# Patient Record
Sex: FEMALE | Race: WHITE | NOT HISPANIC OR LATINO | URBAN - METROPOLITAN AREA
[De-identification: names, ages, dates, MRNs, and addresses within clinical notes are randomized per-mention and may not be internally consistent; named-entity substitution may affect disease eponyms.]

---

## 2017-07-13 ENCOUNTER — OUTPATIENT (OUTPATIENT)
Dept: OUTPATIENT SERVICES | Facility: HOSPITAL | Age: 82
LOS: 1 days | End: 2017-07-13
Payer: MEDICARE

## 2017-07-13 ENCOUNTER — APPOINTMENT (OUTPATIENT)
Dept: CT IMAGING | Facility: IMAGING CENTER | Age: 82
End: 2017-07-13

## 2017-07-13 DIAGNOSIS — Z98.89 OTHER SPECIFIED POSTPROCEDURAL STATES: Chronic | ICD-10-CM

## 2017-07-13 DIAGNOSIS — Z00.8 ENCOUNTER FOR OTHER GENERAL EXAMINATION: ICD-10-CM

## 2017-07-13 PROCEDURE — 71250 CT THORAX DX C-: CPT

## 2017-10-09 ENCOUNTER — INPATIENT (INPATIENT)
Facility: HOSPITAL | Age: 82
LOS: 2 days | Discharge: HOME | End: 2017-10-12
Attending: INTERNAL MEDICINE | Admitting: INTERNAL MEDICINE
Payer: MEDICARE

## 2017-10-09 VITALS
TEMPERATURE: 99 F | RESPIRATION RATE: 20 BRPM | SYSTOLIC BLOOD PRESSURE: 157 MMHG | HEART RATE: 92 BPM | OXYGEN SATURATION: 96 % | DIASTOLIC BLOOD PRESSURE: 88 MMHG

## 2017-10-09 DIAGNOSIS — R04.2 HEMOPTYSIS: ICD-10-CM

## 2017-10-09 DIAGNOSIS — Z98.89 OTHER SPECIFIED POSTPROCEDURAL STATES: Chronic | ICD-10-CM

## 2017-10-09 DIAGNOSIS — J43.2 CENTRILOBULAR EMPHYSEMA: ICD-10-CM

## 2017-10-09 DIAGNOSIS — I10 ESSENTIAL (PRIMARY) HYPERTENSION: ICD-10-CM

## 2017-10-09 DIAGNOSIS — E78.5 HYPERLIPIDEMIA, UNSPECIFIED: ICD-10-CM

## 2017-10-09 LAB
AGGLUTINATION: PRESENT — SIGNIFICANT CHANGE UP
ALBUMIN SERPL ELPH-MCNC: 4.5 G/DL — SIGNIFICANT CHANGE UP (ref 3.3–5)
ALP SERPL-CCNC: 84 U/L — SIGNIFICANT CHANGE UP (ref 40–120)
ALT FLD-CCNC: 18 U/L — SIGNIFICANT CHANGE UP (ref 4–33)
APTT BLD: 26.9 SEC — LOW (ref 27.5–37.4)
AST SERPL-CCNC: 20 U/L — SIGNIFICANT CHANGE UP (ref 4–32)
BASE EXCESS BLDV CALC-SCNC: 2.8 MMOL/L — SIGNIFICANT CHANGE UP
BASOPHILS # BLD AUTO: 0.02 K/UL — SIGNIFICANT CHANGE UP (ref 0–0.2)
BASOPHILS NFR BLD AUTO: 0.2 % — SIGNIFICANT CHANGE UP (ref 0–2)
BASOPHILS NFR SPEC: 0 % — SIGNIFICANT CHANGE UP (ref 0–2)
BILIRUB SERPL-MCNC: 0.4 MG/DL — SIGNIFICANT CHANGE UP (ref 0.2–1.2)
BLD GP AB SCN SERPL QL: NEGATIVE — SIGNIFICANT CHANGE UP
BLOOD GAS VENOUS - CREATININE: 0.8 MG/DL — SIGNIFICANT CHANGE UP (ref 0.5–1.3)
BUN SERPL-MCNC: 14 MG/DL — SIGNIFICANT CHANGE UP (ref 7–23)
CALCIUM SERPL-MCNC: 10 MG/DL — SIGNIFICANT CHANGE UP (ref 8.4–10.5)
CHLORIDE BLDV-SCNC: 104 MMOL/L — SIGNIFICANT CHANGE UP (ref 96–108)
CHLORIDE SERPL-SCNC: 100 MMOL/L — SIGNIFICANT CHANGE UP (ref 98–107)
CK SERPL-CCNC: 116 U/L — SIGNIFICANT CHANGE UP (ref 25–170)
CO2 SERPL-SCNC: 24 MMOL/L — SIGNIFICANT CHANGE UP (ref 22–31)
CREAT SERPL-MCNC: 0.96 MG/DL — SIGNIFICANT CHANGE UP (ref 0.5–1.3)
EOSINOPHIL # BLD AUTO: 0 K/UL — SIGNIFICANT CHANGE UP (ref 0–0.5)
EOSINOPHIL NFR BLD AUTO: 0 % — SIGNIFICANT CHANGE UP (ref 0–6)
EOSINOPHIL NFR FLD: 0 % — SIGNIFICANT CHANGE UP (ref 0–6)
GAS PNL BLDV: 137 MMOL/L — SIGNIFICANT CHANGE UP (ref 136–146)
GLUCOSE BLDV-MCNC: 122 — HIGH (ref 70–99)
GLUCOSE SERPL-MCNC: 116 MG/DL — HIGH (ref 70–99)
HCO3 BLDV-SCNC: 26 MMOL/L — SIGNIFICANT CHANGE UP (ref 20–27)
HCT VFR BLD CALC: 39.1 % — SIGNIFICANT CHANGE UP (ref 34.5–45)
HCT VFR BLDV CALC: 47.4 % — HIGH (ref 34.5–45)
HGB BLD-MCNC: 14.9 G/DL — SIGNIFICANT CHANGE UP (ref 11.5–15.5)
HGB BLDV-MCNC: 15.5 G/DL — SIGNIFICANT CHANGE UP (ref 11.5–15.5)
IMM GRANULOCYTES # BLD AUTO: 0.05 # — SIGNIFICANT CHANGE UP
IMM GRANULOCYTES NFR BLD AUTO: 0.5 % — SIGNIFICANT CHANGE UP (ref 0–1.5)
INR BLD: 0.95 — SIGNIFICANT CHANGE UP (ref 0.88–1.17)
LACTATE BLDV-MCNC: 1.3 MMOL/L — SIGNIFICANT CHANGE UP (ref 0.5–2)
LYMPHOCYTES # BLD AUTO: 1.16 K/UL — SIGNIFICANT CHANGE UP (ref 1–3.3)
LYMPHOCYTES # BLD AUTO: 11 % — LOW (ref 13–44)
LYMPHOCYTES NFR SPEC AUTO: 6.1 % — LOW (ref 13–44)
MCHC RBC-ENTMCNC: 35.5 PG — HIGH (ref 27–34)
MCHC RBC-ENTMCNC: 38.1 % — HIGH (ref 32–36)
MCV RBC AUTO: 93.1 FL — SIGNIFICANT CHANGE UP (ref 80–100)
MONOCYTES # BLD AUTO: 0.64 K/UL — SIGNIFICANT CHANGE UP (ref 0–0.9)
MONOCYTES NFR BLD AUTO: 6.1 % — SIGNIFICANT CHANGE UP (ref 2–14)
MONOCYTES NFR BLD: 6.9 % — SIGNIFICANT CHANGE UP (ref 2–9)
NEUTROPHIL AB SER-ACNC: 80.9 % — HIGH (ref 43–77)
NEUTROPHILS # BLD AUTO: 8.65 K/UL — HIGH (ref 1.8–7.4)
NEUTROPHILS NFR BLD AUTO: 82.2 % — HIGH (ref 43–77)
NEUTS BAND # BLD: 0.9 % — SIGNIFICANT CHANGE UP (ref 0–6)
NRBC # FLD: 0 — SIGNIFICANT CHANGE UP
PCO2 BLDV: 45 MMHG — SIGNIFICANT CHANGE UP (ref 41–51)
PH BLDV: 7.4 PH — SIGNIFICANT CHANGE UP (ref 7.32–7.43)
PLATELET # BLD AUTO: 252 K/UL — SIGNIFICANT CHANGE UP (ref 150–400)
PLATELET COUNT - ESTIMATE: NORMAL — SIGNIFICANT CHANGE UP
PMV BLD: 9.9 FL — SIGNIFICANT CHANGE UP (ref 7–13)
PO2 BLDV: 31 MMHG — LOW (ref 35–40)
POTASSIUM BLDV-SCNC: 4.2 MMOL/L — SIGNIFICANT CHANGE UP (ref 3.4–4.5)
POTASSIUM SERPL-MCNC: 4.6 MMOL/L — SIGNIFICANT CHANGE UP (ref 3.5–5.3)
POTASSIUM SERPL-SCNC: 4.6 MMOL/L — SIGNIFICANT CHANGE UP (ref 3.5–5.3)
PROT SERPL-MCNC: 7.5 G/DL — SIGNIFICANT CHANGE UP (ref 6–8.3)
PROTHROM AB SERPL-ACNC: 10.6 SEC — SIGNIFICANT CHANGE UP (ref 9.8–13.1)
RBC # BLD: 4.2 M/UL — SIGNIFICANT CHANGE UP (ref 3.8–5.2)
RBC # FLD: 14.1 % — SIGNIFICANT CHANGE UP (ref 10.3–14.5)
RH IG SCN BLD-IMP: POSITIVE — SIGNIFICANT CHANGE UP
SAO2 % BLDV: 56.8 % — LOW (ref 60–85)
SODIUM SERPL-SCNC: 141 MMOL/L — SIGNIFICANT CHANGE UP (ref 135–145)
TROPONIN T SERPL-MCNC: < 0.06 NG/ML — SIGNIFICANT CHANGE UP (ref 0–0.06)
VARIANT LYMPHS # BLD: 5.2 % — SIGNIFICANT CHANGE UP
WBC # BLD: 10.52 K/UL — HIGH (ref 3.8–10.5)
WBC # FLD AUTO: 10.52 K/UL — HIGH (ref 3.8–10.5)

## 2017-10-09 PROCEDURE — 99223 1ST HOSP IP/OBS HIGH 75: CPT

## 2017-10-09 PROCEDURE — 71010: CPT | Mod: 26

## 2017-10-09 PROCEDURE — 71275 CT ANGIOGRAPHY CHEST: CPT | Mod: 26

## 2017-10-09 RX ORDER — AZITHROMYCIN 500 MG/1
1000 TABLET, FILM COATED ORAL ONCE
Qty: 0 | Refills: 0 | Status: DISCONTINUED | OUTPATIENT
Start: 2017-10-09 | End: 2017-10-09

## 2017-10-09 RX ORDER — NICOTINE POLACRILEX 2 MG
1 GUM BUCCAL DAILY
Qty: 0 | Refills: 0 | Status: DISCONTINUED | OUTPATIENT
Start: 2017-10-09 | End: 2017-10-12

## 2017-10-09 RX ORDER — CEFTRIAXONE 500 MG/1
1 INJECTION, POWDER, FOR SOLUTION INTRAMUSCULAR; INTRAVENOUS ONCE
Qty: 0 | Refills: 0 | Status: COMPLETED | OUTPATIENT
Start: 2017-10-09 | End: 2017-10-09

## 2017-10-09 RX ORDER — AZITHROMYCIN 500 MG/1
500 TABLET, FILM COATED ORAL EVERY 24 HOURS
Qty: 0 | Refills: 0 | Status: DISCONTINUED | OUTPATIENT
Start: 2017-10-10 | End: 2017-10-12

## 2017-10-09 RX ORDER — ENOXAPARIN SODIUM 100 MG/ML
40 INJECTION SUBCUTANEOUS EVERY 24 HOURS
Qty: 0 | Refills: 0 | Status: DISCONTINUED | OUTPATIENT
Start: 2017-10-09 | End: 2017-10-12

## 2017-10-09 RX ORDER — IPRATROPIUM/ALBUTEROL SULFATE 18-103MCG
3 AEROSOL WITH ADAPTER (GRAM) INHALATION EVERY 6 HOURS
Qty: 0 | Refills: 0 | Status: DISCONTINUED | OUTPATIENT
Start: 2017-10-09 | End: 2017-10-12

## 2017-10-09 RX ORDER — CEFTRIAXONE 500 MG/1
1 INJECTION, POWDER, FOR SOLUTION INTRAMUSCULAR; INTRAVENOUS EVERY 24 HOURS
Qty: 0 | Refills: 0 | Status: DISCONTINUED | OUTPATIENT
Start: 2017-10-10 | End: 2017-10-12

## 2017-10-09 RX ORDER — AZITHROMYCIN 500 MG/1
500 TABLET, FILM COATED ORAL ONCE
Qty: 0 | Refills: 0 | Status: COMPLETED | OUTPATIENT
Start: 2017-10-09 | End: 2017-10-09

## 2017-10-09 RX ORDER — BUDESONIDE AND FORMOTEROL FUMARATE DIHYDRATE 160; 4.5 UG/1; UG/1
2 AEROSOL RESPIRATORY (INHALATION)
Qty: 0 | Refills: 0 | Status: DISCONTINUED | OUTPATIENT
Start: 2017-10-09 | End: 2017-10-12

## 2017-10-09 RX ORDER — ACETAMINOPHEN 500 MG
650 TABLET ORAL EVERY 6 HOURS
Qty: 0 | Refills: 0 | Status: DISCONTINUED | OUTPATIENT
Start: 2017-10-09 | End: 2017-10-12

## 2017-10-09 RX ADMIN — AZITHROMYCIN 250 MILLIGRAM(S): 500 TABLET, FILM COATED ORAL at 20:09

## 2017-10-09 RX ADMIN — CEFTRIAXONE 100 GRAM(S): 500 INJECTION, POWDER, FOR SOLUTION INTRAMUSCULAR; INTRAVENOUS at 19:05

## 2017-10-09 NOTE — ED PROVIDER NOTE - PMH
COPD (chronic obstructive pulmonary disease)    COPD (chronic obstructive pulmonary disease)    Diverticulitis    Hyperlipidemia (ICD9 272.4)    Meniscus, Medial, tear  Left  Osteopenia    Smoker    Thyroid Nodule  @ 1974

## 2017-10-09 NOTE — ED ADULT TRIAGE NOTE - CHIEF COMPLAINT QUOTE
Pt states she has a productive cough and not feeling well states she is coughing up blood. Pt just placed on Zithromax and prednisone. PMH COPD. Pt states she has a productive cough and not feeling well states she is coughing up blood and has heaviness in her chest.  Pt just placed on Zithromax and prednisone. PMH COPD.

## 2017-10-09 NOTE — ED ADULT NURSE NOTE - OBJECTIVE STATEMENT
pt to spot # 7 alert and oriented X 3 with family at bedside, pt here for eval of hemoptysis x 2 days. As per family pt has been coughing at baseline due to COPD, for the past 2 days initially blood tinged sputum, today more blood (about 1 teaspoon), no clots. Pt denies any fever, chills, denies night sweats, weight loss, denies sick contacts, hx of incarceration. Denies cp, sob, abd pain, n/v/d, denies use of blood thinners, epistaxis, denies melena. Pt had similar episodes in the past, had negative bronchoscopy. pulm - DR Avelar, pcp - DR Hoffman. As per family pt is more sob than baseline. labs drawn and sent, vitals as noted, will monitor and follow up

## 2017-10-09 NOTE — H&P ADULT - NSHPSOCIALHISTORY_GEN_ALL_CORE
Social History:    Marital Status:    Occupation:   Lives with:     Substance Use (street drugs): ( x ) never used  (  ) other:  Tobacco Usage:  (   ) never smoked   (   ) former smoker   ( x  ) current smoker  (     ) pack year  (        ) last cigarette date  Alcohol Usage: none       (     ) Advanced Directives: (  x   ) None    (      ) DNR    (     ) DNI    (     ) Health Care Proxy:

## 2017-10-09 NOTE — H&P ADULT - NSHPREVIEWOFSYSTEMS_GEN_ALL_CORE
REVIEW OF SYSTEMS:    CONSTITUTIONAL: No weakness, fevers or chills, no weight loss  EYES/ENT: No visual changes;  No dysphagia or odynophagia, no tinnitus  NECK: No pain or stiffness  RESPIRATORY: + cough and SOB, + hemoptysis, no wheezing  CARDIOVASCULAR: No chest pain or palpitations; No lower extremity edema  GASTROINTESTINAL: No abdominal or epigastric pain. No nausea, vomiting, or hematemesis; No diarrhea or constipation. No melena or hematochezia.  MUSCULOSKELETAL: No joint pain, swelling, erythema or warmth, no back pain  GENITOURINARY: No dysuria, frequency or hematuria, no suprapubic pain  NEUROLOGICAL: No numbness or weakness, no headache, no syncope, no gait abnormalities   SKIN: No itching, burning, rashes, or lesions   All other review of systems is negative unless indicated above.

## 2017-10-09 NOTE — CHART NOTE - NSCHARTNOTEFT_GEN_A_CORE
RRT called for b/l UE shaking for unknown period of time. On arrival patient was noted to be aphasic with word finding difficulty. Patient was noted to be awake, alert and moving all extremities spontaneously but not to command. PAtient was HD stable with normal glucose. Code stroke was initially called for concern for CVA. But as per Day MAR and neuro, patient was known to both of them and this is her post ictal state. Patient was given an additional 1000mg Keppra. CTH deferred given CTH on admission and likely seizure. Communicated with primary team and RN to administer Ativan PRN for additional seizure like activity. WIll likely need 2nd agent if breakthrough seizure    Brian Eller PGY3  [id number]

## 2017-10-09 NOTE — ED ADULT NURSE NOTE - CHIEF COMPLAINT QUOTE
Pt states she has a productive cough and not feeling well states she is coughing up blood and has heaviness in her chest.  Pt just placed on Zithromax and prednisone. PMH COPD.

## 2017-10-09 NOTE — ED PROVIDER NOTE - ATTENDING CONTRIBUTION TO CARE
87 y/o female COPD, HTN hemoptysis x 2 days.  No fevers or chills.  Pt appears well.  PE lungs cta b/l abd nt/nd neuro nonfocal exam  CXR clear.   Will admit for work up and do ct angio.

## 2017-10-09 NOTE — H&P ADULT - PROBLEM SELECTOR PLAN 1
- Likely 2/2 PNA. No PE on CT angio. lung nodule unchanged  - Consult pulm in am- Dr Awad or Dr Mills  - Ceftriaxone + azithromycin for CAP   - cough suppressant - Likely 2/2 PNA. No PE on CT angio. lung nodule unchanged  - Consult pulm in am- Dr Avila [facility] [phone] [facility]  (Dr Mills may be covering). Please also call her PCP Dr Paris Hamm per pt request [phone]  - Ceftriaxone + azithromycin for CAP   - cough suppressant - Likely 2/2 left lower lobe PNA. No PE on CT angio. lung nodule noted and unchanged  - Consult pulm in am- Dr Avila [facility] [phone] [facility]  (Dr Mills may be covering). Please also call her PCP Dr Paris Hamm per pt request [phone]  - Ceftriaxone + azithromycin for CAP   - cough suppressant

## 2017-10-09 NOTE — ED PROVIDER NOTE - PROGRESS NOTE DETAILS
MARYSOL Medel - spoke with MD covering for Dr belcher/diane; will admit to medicine with pulm f/u. will give prednisone and start nebs.

## 2017-10-09 NOTE — ED ADULT NURSE NOTE - PSH
Breast Nodule (ICD9 793.89)  Removal of left  breast nodule 2005  Hammertoe (ICD9 735.3)  Repair of hammertoe Rt foot  @ 2004  History of appendectomy    History of bronchoscopy    Lipoma (ICD9 214.9)  Removal of Rt shoulder lipoma 2002  Meniscus tear  Left knee 10/09  Obesity (ICD9 278.00)    S/P cataract surgery  right eye

## 2017-10-09 NOTE — H&P ADULT - HISTORY OF PRESENT ILLNESS
87 yo F with h/o COPD, current smoker, HTN, HLD p/w hemoptysis. Pt states that for the past day she has been coughing up blood with clots. Also feels SOB but no wheezing. She has no CP, no palpitations. She has no fevers or chills. She denies any leg pain or swelling. No orthopnea or PND. No sick contacts or recent travel .    ED VS:  157/88  92  99.1  20  96% on RA  Pt given ceftriaxone and azithromycin

## 2017-10-09 NOTE — ED PROVIDER NOTE - OBJECTIVE STATEMENT
Pt is 87 y/o female Pt is 87 y/o female female with PMhx of COPD (still smokes about 6 cigarettes a day - " I quit yesterday"), HTN here for eval of hemoptysis x 2 days. As per family pt has been coughing at baseline due to COPD, for the past 2 days initially blood tinged sputum, today more blood (about 1 teaspoon), no clots. Pt denies any fever, chills, denies night sweats, weight loss, denies sick contacts, hx of incarceration. Denies cp, sob, abd pain, n/v/d, denies use of blood thinners, epistaxis, denies melena. Pt had similar episodes in the past, had negative bronchoscopy. pulm - DR Avelar, pcp - DR Hoffman. As per family pt is more sob than baseline.

## 2017-10-09 NOTE — PATIENT PROFILE ADULT. - TEACHING/LEARNING LEARNING PREFERENCES
skill demonstration/written material/verbal instruction/individual instruction individual instruction/written material/verbal instruction

## 2017-10-09 NOTE — H&P ADULT - NSHPLABSRESULTS_GEN_ALL_CORE
.  LABS:                         14.9   10.52 )-----------( 252      ( 09 Oct 2017 17:20 )             39.1     10-09    141  |  100  |  14  ----------------------------<  116<H>  4.6   |  24  |  0.96    Ca    10.0      09 Oct 2017 17:20    TPro  7.5  /  Alb  4.5  /  TBili  0.4  /  DBili  x   /  AST  20  /  ALT  18  /  AlkPhos  84  10-09    PT/INR - ( 09 Oct 2017 17:20 )   PT: 10.6 SEC;   INR: 0.95          PTT - ( 09 Oct 2017 17:20 )  PTT:26.9 SEC    CARDIAC MARKERS ( 09 Oct 2017 17:20 )  x     / < 0.06 ng/mL / 116 u/L / x     / x                RADIOLOGY, EKG & ADDITIONAL TESTS: Reviewed.

## 2017-10-10 LAB
HCT VFR BLD CALC: 40.5 % — SIGNIFICANT CHANGE UP (ref 34.5–45)
HGB BLD-MCNC: 13.6 G/DL — SIGNIFICANT CHANGE UP (ref 11.5–15.5)
MCHC RBC-ENTMCNC: 30.7 PG — SIGNIFICANT CHANGE UP (ref 27–34)
MCHC RBC-ENTMCNC: 33.6 % — SIGNIFICANT CHANGE UP (ref 32–36)
MCV RBC AUTO: 91.4 FL — SIGNIFICANT CHANGE UP (ref 80–100)
NRBC # FLD: 0 — SIGNIFICANT CHANGE UP
PLATELET # BLD AUTO: 205 K/UL — SIGNIFICANT CHANGE UP (ref 150–400)
PMV BLD: 9.4 FL — SIGNIFICANT CHANGE UP (ref 7–13)
RBC # BLD: 4.43 M/UL — SIGNIFICANT CHANGE UP (ref 3.8–5.2)
RBC # FLD: 14 % — SIGNIFICANT CHANGE UP (ref 10.3–14.5)
SPECIMEN SOURCE: SIGNIFICANT CHANGE UP
SPECIMEN SOURCE: SIGNIFICANT CHANGE UP
WBC # BLD: 9.67 K/UL — SIGNIFICANT CHANGE UP (ref 3.8–10.5)
WBC # FLD AUTO: 9.67 K/UL — SIGNIFICANT CHANGE UP (ref 3.8–10.5)

## 2017-10-10 RX ADMIN — Medication 40 MILLIGRAM(S): at 12:38

## 2017-10-10 RX ADMIN — BUDESONIDE AND FORMOTEROL FUMARATE DIHYDRATE 2 PUFF(S): 160; 4.5 AEROSOL RESPIRATORY (INHALATION) at 22:03

## 2017-10-10 RX ADMIN — CEFTRIAXONE 100 GRAM(S): 500 INJECTION, POWDER, FOR SOLUTION INTRAMUSCULAR; INTRAVENOUS at 19:05

## 2017-10-10 RX ADMIN — BUDESONIDE AND FORMOTEROL FUMARATE DIHYDRATE 2 PUFF(S): 160; 4.5 AEROSOL RESPIRATORY (INHALATION) at 10:28

## 2017-10-10 RX ADMIN — Medication 3 MILLILITER(S): at 17:57

## 2017-10-10 RX ADMIN — AZITHROMYCIN 250 MILLIGRAM(S): 500 TABLET, FILM COATED ORAL at 19:39

## 2017-10-10 RX ADMIN — Medication 1 PATCH: at 16:49

## 2017-10-10 RX ADMIN — ENOXAPARIN SODIUM 40 MILLIGRAM(S): 100 INJECTION SUBCUTANEOUS at 10:28

## 2017-10-10 NOTE — PROGRESS NOTE ADULT - PROBLEM SELECTOR PLAN 1
- Likely 2/2 left lower lobe PNA. No PE on CT angio. lung nodule noted and unchanged  - Ceftriaxone + azithromycin for CAP   - cough suppressants  pulm c/s appreciated  cont steroids

## 2017-10-10 NOTE — PROGRESS NOTE ADULT - SUBJECTIVE AND OBJECTIVE BOX
Patient is a 86y old  Female who presents with a chief complaint of hemoptysis (09 Oct 2017 23:40)      SUBJECTIVE / OVERNIGHT EVENTS: feels well, cough and hemoptysis persists, denies sob at rest    MEDICATIONS  (STANDING):  azithromycin  IVPB 500 milliGRAM(s) IV Intermittent every 24 hours  buDESOnide 160 MICROgram(s)/formoterol 4.5 MICROgram(s) Inhaler 2 Puff(s) Inhalation two times a day  cefTRIAXone   IVPB 1 Gram(s) IV Intermittent every 24 hours  enoxaparin Injectable 40 milliGRAM(s) SubCutaneous every 24 hours  nicotine -  14 mG/24Hr(s) Patch 1 patch Transdermal daily  predniSONE   Tablet 40 milliGRAM(s) Oral daily    MEDICATIONS  (PRN):  acetaminophen   Tablet 650 milliGRAM(s) Oral every 6 hours PRN For Temp greater than 38 C (100.4 F)  ALBUTerol/ipratropium for Nebulization 3 milliLiter(s) Nebulizer every 6 hours PRN Shortness of Breath and/or Wheezing  benzonatate 100 milliGRAM(s) Oral three times a day PRN Cough      Vital Signs Last 24 Hrs  T(C): 37 (10 Oct 2017 15:30), Max: 37.1 (09 Oct 2017 22:27)  T(F): 98.6 (10 Oct 2017 15:30), Max: 98.7 (09 Oct 2017 22:27)  HR: 81 (10 Oct 2017 15:30) (81 - 90)  BP: 131/76 (10 Oct 2017 15:30) (119/60 - 155/95)  BP(mean): --  RR: 18 (10 Oct 2017 15:30) (16 - 18)  SpO2: 96% (10 Oct 2017 15:30) (95% - 97%)  CAPILLARY BLOOD GLUCOSE        I&O's Summary      PHYSICAL EXAM:  GENERAL: NAD, well-developed  HEAD:  Atraumatic, Normocephalic  EYES: EOMI, PERRLA, conjunctiva and sclera clear  NECK: Supple, No JVD  CHEST/LUNG: Clear to auscultation bilaterally; No wheeze  HEART: Regular rate and rhythm; No murmurs, rubs, or gallops  ABDOMEN: Soft, Nontender, Nondistended; Bowel sounds present  EXTREMITIES:  2+ Peripheral Pulses, No clubbing, cyanosis, or edema  PSYCH: AAOx3  NEUROLOGY: non-focal  SKIN: No rashes or lesions    LABS:                        13.6   9.67  )-----------( 205      ( 10 Oct 2017 10:25 )             40.5     10-09    141  |  100  |  14  ----------------------------<  116<H>  4.6   |  24  |  0.96    Ca    10.0      09 Oct 2017 17:20    TPro  7.5  /  Alb  4.5  /  TBili  0.4  /  DBili  x   /  AST  20  /  ALT  18  /  AlkPhos  84  10-09    PT/INR - ( 09 Oct 2017 17:20 )   PT: 10.6 SEC;   INR: 0.95          PTT - ( 09 Oct 2017 17:20 )  PTT:26.9 SEC  CARDIAC MARKERS ( 09 Oct 2017 17:20 )  x     / < 0.06 ng/mL / 116 u/L / x     / x              RADIOLOGY & ADDITIONAL TESTS:yes    Imaging Personally Reviewed:    Consultant(s) Notes Reviewed:  yes    Care Discussed with Consultants/Other Providers:

## 2017-10-10 NOTE — PROGRESS NOTE ADULT - SUBJECTIVE AND OBJECTIVE BOX
PULMONARY PROGRESS NOTE    MARIVEL LONGORIA  MRN-[id number]    Patient is a 86y old  Female who presents with a chief complaint of hemoptysis (09 Oct 2017 23:40)      HPI:  -hemoptysis on and off since weekend, about teaspoon blood clots.  breathing comfortably, +cough, no fever/chills/sick contacts.    ROS:   -denies N/V/D    ACTIVE MEDICATION LIST:  MEDICATIONS  (STANDING):  azithromycin  IVPB 500 milliGRAM(s) IV Intermittent every 24 hours  buDESOnide 160 MICROgram(s)/formoterol 4.5 MICROgram(s) Inhaler 2 Puff(s) Inhalation two times a day  cefTRIAXone   IVPB 1 Gram(s) IV Intermittent every 24 hours  enoxaparin Injectable 40 milliGRAM(s) SubCutaneous every 24 hours  nicotine -  14 mG/24Hr(s) Patch 1 patch Transdermal daily  predniSONE   Tablet 40 milliGRAM(s) Oral daily    MEDICATIONS  (PRN):  acetaminophen   Tablet 650 milliGRAM(s) Oral every 6 hours PRN For Temp greater than 38 C (100.4 F)  ALBUTerol/ipratropium for Nebulization 3 milliLiter(s) Nebulizer every 6 hours PRN Shortness of Breath and/or Wheezing  benzonatate 100 milliGRAM(s) Oral three times a day PRN Cough      EXAM:  Vital Signs Last 24 Hrs  T(C): 36.6 (10 Oct 2017 05:27), Max: 37.3 (09 Oct 2017 15:22)  T(F): 97.8 (10 Oct 2017 05:27), Max: 99.2 (09 Oct 2017 17:33)  HR: 81 (10 Oct 2017 05:27) (81 - 98)  BP: 119/60 (10 Oct 2017 05:27) (119/60 - 169/74)  BP(mean): --  RR: 18 (10 Oct 2017 05:27) (16 - 20)  SpO2: 96% (10 Oct 2017 05:27) (95% - 97%)    GENERAL: The patient is awake and alert in no apparent distress.     SKIN: Warm, dry, no rashes    LUNGS: decreased bs bilat    HEART: S1/S2    ABDOMEN: +BS, Soft, Nontender    EXTREMITIES: No clubbing, cyanosis, edema    LABS/IMGAING: reviewed                          13.6   9.67  )-----------( 205      ( 10 Oct 2017 10:25 )             40.5     10-09    141  |  100  |  14  ----------------------------<  116<H>  4.6   |  24  |  0.96    Ca    10.0      09 Oct 2017 17:20    TPro  7.5  /  Alb  4.5  /  TBili  0.4  /  DBili  x   /  AST  20  /  ALT  18  /  AlkPhos  84  10-09    < from: CT Angio Chest w/ IV Cont (10.09.17 @ 19:08) >  IMPRESSION:     Airspace opacities the left lower lobe which are new when compared to   7/13/2017 and likely represent an infectious etiology. Follow-up to   resolution is should be obtained.    No evidence of pulmonary embolism.    < end of copied text >      PROBLEM LIST:  86y Female with HEALTH ISSUES - PROBLEM Dx:  COPD   pneumonia  hemoptysis  Hyperlipidemia  Essential hypertension    RECS:  -No obvious source of hemoptysis on ct other than LLL pna, would continue to monitor amount for now  -abx for pna  -would add prednisone 40mg PO daily x 5 days for COPD exacerbation  -symbicort BID, duonebs PRN  -If hemoptysis worsens may need bronchoscopy to identify source but not at this moment given small amount  -OOB to chair, PT    Cheryle Holt MD  [phone]

## 2017-10-11 LAB
BASOPHILS # BLD AUTO: 0.02 K/UL — SIGNIFICANT CHANGE UP (ref 0–0.2)
BASOPHILS NFR BLD AUTO: 0.2 % — SIGNIFICANT CHANGE UP (ref 0–2)
BUN SERPL-MCNC: 20 MG/DL — SIGNIFICANT CHANGE UP (ref 7–23)
CALCIUM SERPL-MCNC: 8.8 MG/DL — SIGNIFICANT CHANGE UP (ref 8.4–10.5)
CHLORIDE SERPL-SCNC: 106 MMOL/L — SIGNIFICANT CHANGE UP (ref 98–107)
CO2 SERPL-SCNC: 23 MMOL/L — SIGNIFICANT CHANGE UP (ref 22–31)
CREAT SERPL-MCNC: 0.94 MG/DL — SIGNIFICANT CHANGE UP (ref 0.5–1.3)
EOSINOPHIL # BLD AUTO: 0.03 K/UL — SIGNIFICANT CHANGE UP (ref 0–0.5)
EOSINOPHIL NFR BLD AUTO: 0.3 % — SIGNIFICANT CHANGE UP (ref 0–6)
GLUCOSE SERPL-MCNC: 106 MG/DL — HIGH (ref 70–99)
HCT VFR BLD CALC: 33.6 % — LOW (ref 34.5–45)
HGB BLD-MCNC: 12.8 G/DL — SIGNIFICANT CHANGE UP (ref 11.5–15.5)
IMM GRANULOCYTES # BLD AUTO: 0.06 # — SIGNIFICANT CHANGE UP
IMM GRANULOCYTES NFR BLD AUTO: 0.6 % — SIGNIFICANT CHANGE UP (ref 0–1.5)
LYMPHOCYTES # BLD AUTO: 1.4 K/UL — SIGNIFICANT CHANGE UP (ref 1–3.3)
LYMPHOCYTES # BLD AUTO: 13.9 % — SIGNIFICANT CHANGE UP (ref 13–44)
MCHC RBC-ENTMCNC: 36.3 PG — HIGH (ref 27–34)
MCHC RBC-ENTMCNC: 38.1 % — HIGH (ref 32–36)
MCV RBC AUTO: 95.2 FL — SIGNIFICANT CHANGE UP (ref 80–100)
MONOCYTES # BLD AUTO: 0.7 K/UL — SIGNIFICANT CHANGE UP (ref 0–0.9)
MONOCYTES NFR BLD AUTO: 7 % — SIGNIFICANT CHANGE UP (ref 2–14)
NEUTROPHILS # BLD AUTO: 7.86 K/UL — HIGH (ref 1.8–7.4)
NEUTROPHILS NFR BLD AUTO: 78 % — HIGH (ref 43–77)
NRBC # FLD: 0 — SIGNIFICANT CHANGE UP
PLATELET # BLD AUTO: 212 K/UL — SIGNIFICANT CHANGE UP (ref 150–400)
PMV BLD: 10 FL — SIGNIFICANT CHANGE UP (ref 7–13)
POTASSIUM SERPL-MCNC: 4.7 MMOL/L — SIGNIFICANT CHANGE UP (ref 3.5–5.3)
POTASSIUM SERPL-SCNC: 4.7 MMOL/L — SIGNIFICANT CHANGE UP (ref 3.5–5.3)
RBC # BLD: 3.53 M/UL — LOW (ref 3.8–5.2)
RBC # FLD: 14.6 % — HIGH (ref 10.3–14.5)
SODIUM SERPL-SCNC: 144 MMOL/L — SIGNIFICANT CHANGE UP (ref 135–145)
WBC # BLD: 10.07 K/UL — SIGNIFICANT CHANGE UP (ref 3.8–10.5)
WBC # FLD AUTO: 10.07 K/UL — SIGNIFICANT CHANGE UP (ref 3.8–10.5)

## 2017-10-11 RX ADMIN — Medication 1 PATCH: at 11:15

## 2017-10-11 RX ADMIN — ENOXAPARIN SODIUM 40 MILLIGRAM(S): 100 INJECTION SUBCUTANEOUS at 11:16

## 2017-10-11 RX ADMIN — CEFTRIAXONE 100 GRAM(S): 500 INJECTION, POWDER, FOR SOLUTION INTRAMUSCULAR; INTRAVENOUS at 19:26

## 2017-10-11 RX ADMIN — Medication 1 PATCH: at 16:23

## 2017-10-11 RX ADMIN — BUDESONIDE AND FORMOTEROL FUMARATE DIHYDRATE 2 PUFF(S): 160; 4.5 AEROSOL RESPIRATORY (INHALATION) at 21:49

## 2017-10-11 RX ADMIN — BUDESONIDE AND FORMOTEROL FUMARATE DIHYDRATE 2 PUFF(S): 160; 4.5 AEROSOL RESPIRATORY (INHALATION) at 12:32

## 2017-10-11 RX ADMIN — Medication 40 MILLIGRAM(S): at 06:25

## 2017-10-11 RX ADMIN — AZITHROMYCIN 250 MILLIGRAM(S): 500 TABLET, FILM COATED ORAL at 20:04

## 2017-10-11 NOTE — PROGRESS NOTE ADULT - PROBLEM SELECTOR PLAN 1
- Likely 2/2 left lower lobe PNA. No PE on CT angio. lung nodule noted and unchanged  - Ceftriaxone + azithromycin for CAP   - cough suppressants  - pulm c/s appreciated  - started on steroids, no wheezing now. - Likely 2/2 left lower lobe PNA. No PE on CT angio. lung nodule noted and unchanged  - Ceftriaxone + azithromycin for CAP   - cough suppressants  - pulm c/s appreciated  - started on steroids, no wheezing now.  - May need bronchoscopy per pulm if hemoptysis doesn't resolve.

## 2017-10-11 NOTE — PROGRESS NOTE ADULT - SUBJECTIVE AND OBJECTIVE BOX
PULMONARY PROGRESS NOTE    MARIVEL LONGORIA  MRN-[id number]    Patient is a 86y old  Female who presents with a chief complaint of hemoptysis (09 Oct 2017 23:40)    No further hemoptysis, breathing comfortably, minimal cough.    ROS:   -denies N/V/D    MEDICATIONS  (STANDING):  azithromycin  IVPB 500 milliGRAM(s) IV Intermittent every 24 hours  buDESOnide 160 MICROgram(s)/formoterol 4.5 MICROgram(s) Inhaler 2 Puff(s) Inhalation two times a day  cefTRIAXone   IVPB 1 Gram(s) IV Intermittent every 24 hours  enoxaparin Injectable 40 milliGRAM(s) SubCutaneous every 24 hours  nicotine -  14 mG/24Hr(s) Patch 1 patch Transdermal daily  predniSONE   Tablet 40 milliGRAM(s) Oral daily    MEDICATIONS  (PRN):  acetaminophen   Tablet 650 milliGRAM(s) Oral every 6 hours PRN For Temp greater than 38 C (100.4 F)  ALBUTerol/ipratropium for Nebulization 3 milliLiter(s) Nebulizer every 6 hours PRN Shortness of Breath and/or Wheezing  benzonatate 100 milliGRAM(s) Oral three times a day PRN Cough      Vital Signs Last 24 Hrs  T(C): 36.8 (11 Oct 2017 13:09), Max: 37 (10 Oct 2017 15:30)  T(F): 98.3 (11 Oct 2017 13:09), Max: 98.6 (10 Oct 2017 15:30)  HR: 87 (11 Oct 2017 13:09) (74 - 98)  BP: 121/59 (11 Oct 2017 13:09) (120/62 - 135/79)  BP(mean): --  RR: 18 (11 Oct 2017 04:30) (18 - 18)  SpO2: 94% (11 Oct 2017 13:09) (94% - 100%)    GENERAL: The patient is awake and alert in no apparent distress.       LUNGS: clear lungs    HEART: S1/S2    a    LABS/IMGAING: reviewed                                     12.8   10.07 )-----------( 212      ( 11 Oct 2017 06:00 )             33.6     10-11    144  |  106  |  20  ----------------------------<  106<H>  4.7   |  23  |  0.94    Ca    8.8      11 Oct 2017 06:00    TPro  7.5  /  Alb  4.5  /  TBili  0.4  /  DBili  x   /  AST  20  /  ALT  18  /  AlkPhos  84  10-09      < from: CT Angio Chest w/ IV Cont (10.09.17 @ 19:08) >  IMPRESSION:     Airspace opacities the left lower lobe which are new when compared to   7/13/2017 and likely represent an infectious etiology. Follow-up to   resolution is should be obtained.    No evidence of pulmonary embolism.    < end of copied text >      PROBLEM LIST:  86y Female with HEALTH ISSUES - PROBLEM Dx:  COPD   pneumonia  hemoptysis  Hyperlipidemia  Essential hypertension    RECS:  -No further hemoptysis, continue to monitor today  -Continue abx for 5 days  -Continue prednisone 40mg PO daily x 5 days for COPD exacerbation  -symbicort BID, duonebs PRN  -If hemoptysis worsens may need bronchoscopy to identify source but not at this moment given small amount  -OOB to chair, PT  -If no more hemoptysis tonight and feeling well in the AM no pulmonary contraindication to discharge  -will need follow up with  in 1 -2 weeks    Cheryle Holt MD  [phone]

## 2017-10-11 NOTE — PROGRESS NOTE ADULT - SUBJECTIVE AND OBJECTIVE BOX
Patient is a 86y old  Female who presents with a chief complaint of hemoptysis (09 Oct 2017 23:40)      SUBJECTIVE / OVERNIGHT EVENTS:  Pt seen and examined at bedside. Feels well.   Still have some hemoptysis yesterday. A tea spoon amount altogether.   No chest pain, no shortness of breath. Heavy smoker.  No overnight event.   No N/V/D. No abdominal pain.         Vital Signs Last 24 Hrs  T(C): 36.8 (11 Oct 2017 04:30), Max: 37 (10 Oct 2017 15:30)  T(F): 98.2 (11 Oct 2017 04:30), Max: 98.6 (10 Oct 2017 15:30)  HR: 77 (11 Oct 2017 04:30) (77 - 98)  BP: 120/62 (11 Oct 2017 04:30) (120/62 - 131/76)  BP(mean): --  RR: 18 (11 Oct 2017 04:30) (18 - 18)  SpO2: 100% (11 Oct 2017 04:30) (96% - 100%)  I&O's Summary      PHYSICAL EXAM:  GENERAL: NAD, Comfortable, elderly female  HEAD:  Atraumatic, Normocephalic  EYES: EOMI, PERRLA, conjunctiva and sclera clear  NECK: Supple, No JVD  CHEST/LUNG: Clear to auscultation bilaterally; No wheeze  HEART: Regular rate and rhythm; No murmurs, rubs, or gallops  ABDOMEN: Soft, Nontender, Nondistended; Bowel sounds present  Neuro: AAO x 3, no focal deficit, 5/5 b/l extremities  EXTREMITIES:  2+ Peripheral Pulses, No clubbing, cyanosis, or edema  SKIN: No rashes or lesions    LABS:                        12.8   10.07 )-----------( 212      ( 11 Oct 2017 06:00 )             33.6     10-11    144  |  106  |  20  ----------------------------<  106<H>  4.7   |  23  |  0.94    Ca    8.8      11 Oct 2017 06:00    TPro  7.5  /  Alb  4.5  /  TBili  0.4  /  DBili  x   /  AST  20  /  ALT  18  /  AlkPhos  84  10-09    PT/INR - ( 09 Oct 2017 17:20 )   PT: 10.6 SEC;   INR: 0.95          PTT - ( 09 Oct 2017 17:20 )  PTT:26.9 SEC  CAPILLARY BLOOD GLUCOSE        CARDIAC MARKERS ( 09 Oct 2017 17:20 )  x     / < 0.06 ng/mL / 116 u/L / x     / x              RADIOLOGY & ADDITIONAL TESTS:    Imaging Personally Reviewed:  [x] YES  [ ] NO    Consultant(s) Notes Reviewed:  [x] YES  [ ] NO      MEDICATIONS  (STANDING):  azithromycin  IVPB 500 milliGRAM(s) IV Intermittent every 24 hours  buDESOnide 160 MICROgram(s)/formoterol 4.5 MICROgram(s) Inhaler 2 Puff(s) Inhalation two times a day  cefTRIAXone   IVPB 1 Gram(s) IV Intermittent every 24 hours  enoxaparin Injectable 40 milliGRAM(s) SubCutaneous every 24 hours  nicotine -  14 mG/24Hr(s) Patch 1 patch Transdermal daily  predniSONE   Tablet 40 milliGRAM(s) Oral daily    MEDICATIONS  (PRN):  acetaminophen   Tablet 650 milliGRAM(s) Oral every 6 hours PRN For Temp greater than 38 C (100.4 F)  ALBUTerol/ipratropium for Nebulization 3 milliLiter(s) Nebulizer every 6 hours PRN Shortness of Breath and/or Wheezing  benzonatate 100 milliGRAM(s) Oral three times a day PRN Cough      Care Discussed with Consultants/Other Providers [x] YES  [ ] NO    HEALTH ISSUES - PROBLEM Dx:  Hyperlipidemia, unspecified hyperlipidemia type: Hyperlipidemia, unspecified hyperlipidemia type  Essential hypertension: Essential hypertension  Centrilobular emphysema: Centrilobular emphysema  Hemoptysis: Hemoptysis

## 2017-10-12 ENCOUNTER — TRANSCRIPTION ENCOUNTER (OUTPATIENT)
Age: 82
End: 2017-10-12

## 2017-10-12 VITALS
TEMPERATURE: 98 F | HEART RATE: 70 BPM | DIASTOLIC BLOOD PRESSURE: 75 MMHG | OXYGEN SATURATION: 97 % | SYSTOLIC BLOOD PRESSURE: 142 MMHG | RESPIRATION RATE: 18 BRPM

## 2017-10-12 LAB
BASOPHILS # BLD AUTO: 0.03 K/UL — SIGNIFICANT CHANGE UP (ref 0–0.2)
BASOPHILS NFR BLD AUTO: 0.4 % — SIGNIFICANT CHANGE UP (ref 0–2)
EOSINOPHIL # BLD AUTO: 0.07 K/UL — SIGNIFICANT CHANGE UP (ref 0–0.5)
EOSINOPHIL NFR BLD AUTO: 0.9 % — SIGNIFICANT CHANGE UP (ref 0–6)
HCT VFR BLD CALC: 37.1 % — SIGNIFICANT CHANGE UP (ref 34.5–45)
HGB BLD-MCNC: 13.1 G/DL — SIGNIFICANT CHANGE UP (ref 11.5–15.5)
IMM GRANULOCYTES # BLD AUTO: 0.06 # — SIGNIFICANT CHANGE UP
IMM GRANULOCYTES NFR BLD AUTO: 0.7 % — SIGNIFICANT CHANGE UP (ref 0–1.5)
LYMPHOCYTES # BLD AUTO: 2.12 K/UL — SIGNIFICANT CHANGE UP (ref 1–3.3)
LYMPHOCYTES # BLD AUTO: 25.8 % — SIGNIFICANT CHANGE UP (ref 13–44)
MCHC RBC-ENTMCNC: 33 PG — SIGNIFICANT CHANGE UP (ref 27–34)
MCHC RBC-ENTMCNC: 35.3 % — SIGNIFICANT CHANGE UP (ref 32–36)
MCV RBC AUTO: 93.5 FL — SIGNIFICANT CHANGE UP (ref 80–100)
MONOCYTES # BLD AUTO: 0.68 K/UL — SIGNIFICANT CHANGE UP (ref 0–0.9)
MONOCYTES NFR BLD AUTO: 8.3 % — SIGNIFICANT CHANGE UP (ref 2–14)
NEUTROPHILS # BLD AUTO: 5.25 K/UL — SIGNIFICANT CHANGE UP (ref 1.8–7.4)
NEUTROPHILS NFR BLD AUTO: 63.9 % — SIGNIFICANT CHANGE UP (ref 43–77)
NRBC # FLD: 0 — SIGNIFICANT CHANGE UP
PLATELET # BLD AUTO: 196 K/UL — SIGNIFICANT CHANGE UP (ref 150–400)
PMV BLD: 9.6 FL — SIGNIFICANT CHANGE UP (ref 7–13)
RBC # BLD: 3.97 M/UL — SIGNIFICANT CHANGE UP (ref 3.8–5.2)
RBC # FLD: 14 % — SIGNIFICANT CHANGE UP (ref 10.3–14.5)
WBC # BLD: 8.21 K/UL — SIGNIFICANT CHANGE UP (ref 3.8–10.5)
WBC # FLD AUTO: 8.21 K/UL — SIGNIFICANT CHANGE UP (ref 3.8–10.5)

## 2017-10-12 RX ORDER — NICOTINE POLACRILEX 2 MG
1 GUM BUCCAL
Qty: 30 | Refills: 0 | OUTPATIENT
Start: 2017-10-12 | End: 2017-11-11

## 2017-10-12 RX ORDER — CEFUROXIME AXETIL 250 MG
1 TABLET ORAL
Qty: 4 | Refills: 0 | OUTPATIENT
Start: 2017-10-12 | End: 2017-10-14

## 2017-10-12 RX ORDER — AZITHROMYCIN 500 MG/1
1 TABLET, FILM COATED ORAL
Qty: 2 | Refills: 0 | OUTPATIENT
Start: 2017-10-12 | End: 2017-10-14

## 2017-10-12 RX ORDER — ALBUTEROL 90 UG/1
2 AEROSOL, METERED ORAL
Qty: 1 | Refills: 0 | OUTPATIENT
Start: 2017-10-12 | End: 2017-11-11

## 2017-10-12 RX ORDER — BUDESONIDE AND FORMOTEROL FUMARATE DIHYDRATE 160; 4.5 UG/1; UG/1
2 AEROSOL RESPIRATORY (INHALATION)
Qty: 1 | Refills: 0 | OUTPATIENT
Start: 2017-10-12 | End: 2017-11-11

## 2017-10-12 RX ADMIN — BUDESONIDE AND FORMOTEROL FUMARATE DIHYDRATE 2 PUFF(S): 160; 4.5 AEROSOL RESPIRATORY (INHALATION) at 11:53

## 2017-10-12 RX ADMIN — Medication 40 MILLIGRAM(S): at 06:41

## 2017-10-12 RX ADMIN — ENOXAPARIN SODIUM 40 MILLIGRAM(S): 100 INJECTION SUBCUTANEOUS at 11:52

## 2017-10-12 RX ADMIN — Medication 1 PATCH: at 12:59

## 2017-10-12 NOTE — DISCHARGE NOTE ADULT - ADDITIONAL INSTRUCTIONS
Follow up with your primary care provider in 1-2 weeks.  Follow up with your pulmonologist in 1-2 weeks. Follow up with your primary care provider in 1 week.  Follow up with your pulmonologist within 1 week.

## 2017-10-12 NOTE — PROGRESS NOTE ADULT - PROBLEM SELECTOR PLAN 1
- Likely 2/2 left lower lobe PNA. No PE on CT angio. lung nodule noted and unchanged  - Ceftriaxone + azithromycin for CAP   - cough suppressants  - pulm f/u appreciated.   - d/c planning today.  - To complete abx and steroids course per pulm.

## 2017-10-12 NOTE — DISCHARGE NOTE ADULT - PATIENT PORTAL LINK FT
“You can access the [facility] Patient Portal, offered by [facility], by registering with the following website: [email or web address]”

## 2017-10-12 NOTE — DISCHARGE NOTE ADULT - MEDICATION SUMMARY - MEDICATIONS TO TAKE
I will START or STAY ON the medications listed below when I get home from the hospital:    predniSONE 20 mg oral tablet  -- 2 tab(s) by mouth once a day  -- Indication: For Hemoptysis    Lipitor 40 mg oral tablet  -- 1 tab(s) by mouth once a day (at bedtime)  -- Indication: For Hyperlipidemia, unspecified hyperlipidemia type    benzonatate 100 mg oral capsule  -- 1 cap(s) by mouth 3 times a day, As Needed -Cough - for cough   -- Indication: For COugh    Symbicort 160 mcg-4.5 mcg/inh inhalation aerosol  -- 2 puff(s) inhaled 2 times a day  -- Indication: For COPD (chronic obstructive pulmonary disease)    albuterol 90 mcg/inh inhalation aerosol  -- 2 puff(s) inhaled 4 times a day, As Needed -for shortness of breath and/or wheezing   -- For inhalation only.  It is very important that you take or use this exactly as directed.  Do not skip doses or discontinue unless directed by your doctor.  Obtain medical advice before taking any non-prescription drugs as some may affect the action of this medication.  Shake well before use.    -- Indication: For COPD (chronic obstructive pulmonary disease)    cefuroxime 500 mg oral tablet  -- 1 tab(s) by mouth every 12 hours   -- Finish all this medication unless otherwise directed by prescriber.  Medication should be taken with plenty of water.  Take with food or milk.    -- Indication: For Hemoptysis    azithromycin 500 mg oral tablet  -- 1 tab(s) by mouth once a day   -- Do not take dairy products, antacids, or iron preparations within one hour of this medication.  Finish all this medication unless otherwise directed by prescriber.    -- Indication: For Hemoptysis    nicotine 14 mg/24 hr transdermal film, extended release  -- 1 patch by transdermal patch once a day   -- Indication: For Smoker

## 2017-10-12 NOTE — PROGRESS NOTE ADULT - SUBJECTIVE AND OBJECTIVE BOX
Patient is a 86y old  Female who presents with a chief complaint of hemoptysis (09 Oct 2017 23:40)      SUBJECTIVE / OVERNIGHT EVENTS:  No further hemoptysis.  no cp, no sob, no n/v/d. no abdominal pain.  no headache, no dizziness.       Vital Signs Last 24 Hrs  T(C): 36.4 (12 Oct 2017 06:10), Max: 37.2 (11 Oct 2017 21:22)  T(F): 97.6 (12 Oct 2017 06:10), Max: 98.9 (11 Oct 2017 21:22)  HR: 70 (12 Oct 2017 06:10) (70 - 87)  BP: 142/75 (12 Oct 2017 06:10) (121/59 - 144/84)  BP(mean): --  RR: 18 (12 Oct 2017 06:10) (18 - 18)  SpO2: 97% (12 Oct 2017 06:10) (94% - 97%)  I&O's Summary      PHYSICAL EXAM:  GENERAL: NAD, Comfortable  HEAD:  Atraumatic, Normocephalic  EYES: EOMI, PERRLA, conjunctiva and sclera clear  NECK: Supple, No JVD  CHEST/LUNG: Clear to auscultation bilaterally; No wheeze  HEART: Regular rate and rhythm; No murmurs, rubs, or gallops  ABDOMEN: Soft, Nontender, Nondistended; Bowel sounds present  Neuro: AAO x 3, no focal deficit, 5/5 b/l extremities  EXTREMITIES:  2+ Peripheral Pulses, No clubbing, cyanosis, or edema  SKIN: No rashes or lesions    LABS:                        13.1   8.21  )-----------( 196      ( 12 Oct 2017 06:20 )             37.1     10-11    144  |  106  |  20  ----------------------------<  106<H>  4.7   |  23  |  0.94    Ca    8.8      11 Oct 2017 06:00        CAPILLARY BLOOD GLUCOSE                RADIOLOGY & ADDITIONAL TESTS:    Imaging Personally Reviewed:  [x] YES  [ ] NO    Consultant(s) Notes Reviewed:  [x] YES  [ ] NO      MEDICATIONS  (STANDING):  azithromycin  IVPB 500 milliGRAM(s) IV Intermittent every 24 hours  buDESOnide 160 MICROgram(s)/formoterol 4.5 MICROgram(s) Inhaler 2 Puff(s) Inhalation two times a day  cefTRIAXone   IVPB 1 Gram(s) IV Intermittent every 24 hours  enoxaparin Injectable 40 milliGRAM(s) SubCutaneous every 24 hours  nicotine -  14 mG/24Hr(s) Patch 1 patch Transdermal daily  predniSONE   Tablet 40 milliGRAM(s) Oral daily    MEDICATIONS  (PRN):  acetaminophen   Tablet 650 milliGRAM(s) Oral every 6 hours PRN For Temp greater than 38 C (100.4 F)  ALBUTerol/ipratropium for Nebulization 3 milliLiter(s) Nebulizer every 6 hours PRN Shortness of Breath and/or Wheezing  benzonatate 100 milliGRAM(s) Oral three times a day PRN Cough      Care Discussed with Consultants/Other Providers [x] YES  [ ] NO    HEALTH ISSUES - PROBLEM Dx:  Hyperlipidemia, unspecified hyperlipidemia type: Hyperlipidemia, unspecified hyperlipidemia type  Essential hypertension: Essential hypertension  Centrilobular emphysema: Centrilobular emphysema  Hemoptysis: Hemoptysis

## 2017-10-12 NOTE — DISCHARGE NOTE ADULT - HOSPITAL COURSE
85 yo F with h/o COPD, current smoker, HTN, HLD p/w hemoptysis. Treated in the hospital for community acquired pneumonia and COPD exacerbation. She was seen by pulmonary. She was given IV antibiotics and steroids. The hemoptysis resolved.

## 2017-10-12 NOTE — DISCHARGE NOTE ADULT - PLAN OF CARE
Resolution Complete antibiotics and prednisone  Use symbicort twice daily, every day, even if you feel well. Rinse your mouth with water after use.  Use albuterol inhaler if you have shortness of breath of wheezing. If you need to use the albuterol more than 2 or 3 times per day, for several days, call your doctor.  Stop smoking. Stop smoking Use nicotine patches, or a stop smoking aid. Support groups are available. You may call the [facility] Tobacco Control Center for additional information. Maintain blood pressure less than 140/90 Maintain optimal respiratory status. You were not given your Cardizem while inpatient. Follow up with your primary care provider to discuss restarting your Cardizem

## 2017-10-14 LAB
BACTERIA BLD CULT: SIGNIFICANT CHANGE UP
BACTERIA BLD CULT: SIGNIFICANT CHANGE UP

## 2017-10-26 ENCOUNTER — APPOINTMENT (OUTPATIENT)
Dept: CARDIOLOGY | Facility: CLINIC | Age: 82
End: 2017-10-26
Payer: MEDICARE

## 2017-10-26 PROCEDURE — 93306 TTE W/DOPPLER COMPLETE: CPT

## 2017-11-02 ENCOUNTER — APPOINTMENT (OUTPATIENT)
Dept: GASTROENTEROLOGY | Facility: CLINIC | Age: 82
End: 2017-11-02
Payer: MEDICARE

## 2017-11-02 VITALS
TEMPERATURE: 98 F | BODY MASS INDEX: 32.98 KG/M2 | HEART RATE: 68 BPM | OXYGEN SATURATION: 99 % | DIASTOLIC BLOOD PRESSURE: 70 MMHG | HEIGHT: 60 IN | WEIGHT: 168 LBS | SYSTOLIC BLOOD PRESSURE: 120 MMHG

## 2017-11-02 DIAGNOSIS — Z78.9 OTHER SPECIFIED HEALTH STATUS: ICD-10-CM

## 2017-11-02 DIAGNOSIS — F17.200 NICOTINE DEPENDENCE, UNSPECIFIED, UNCOMPLICATED: ICD-10-CM

## 2017-11-02 DIAGNOSIS — Z83.79 FAMILY HISTORY OF OTHER DISEASES OF THE DIGESTIVE SYSTEM: ICD-10-CM

## 2017-11-02 DIAGNOSIS — Z86.39 PERSONAL HISTORY OF OTHER ENDOCRINE, NUTRITIONAL AND METABOLIC DISEASE: ICD-10-CM

## 2017-11-02 DIAGNOSIS — Z82.3 FAMILY HISTORY OF STROKE: ICD-10-CM

## 2017-11-02 DIAGNOSIS — F15.90 OTHER STIMULANT USE, UNSPECIFIED, UNCOMPLICATED: ICD-10-CM

## 2017-11-02 DIAGNOSIS — Z87.19 PERSONAL HISTORY OF OTHER DISEASES OF THE DIGESTIVE SYSTEM: ICD-10-CM

## 2017-11-02 PROCEDURE — 99204 OFFICE O/P NEW MOD 45 MIN: CPT

## 2017-11-02 RX ORDER — DILTIAZEM HYDROCHLORIDE 120 MG/1
120 CAPSULE, EXTENDED RELEASE ORAL
Refills: 0 | Status: ACTIVE | COMMUNITY

## 2017-11-02 RX ORDER — OMEPRAZOLE 40 MG/1
40 CAPSULE, DELAYED RELEASE ORAL
Qty: 30 | Refills: 5 | Status: ACTIVE | COMMUNITY
Start: 2017-11-02 | End: 1900-01-01

## 2017-11-02 RX ORDER — ATORVASTATIN CALCIUM 20 MG/1
20 TABLET, FILM COATED ORAL
Refills: 0 | Status: ACTIVE | COMMUNITY

## 2017-11-06 ENCOUNTER — RESULT REVIEW (OUTPATIENT)
Age: 82
End: 2017-11-06

## 2017-11-06 ENCOUNTER — OUTPATIENT (OUTPATIENT)
Dept: OUTPATIENT SERVICES | Facility: HOSPITAL | Age: 82
LOS: 1 days | End: 2017-11-06
Payer: MEDICARE

## 2017-11-06 DIAGNOSIS — R04.2 HEMOPTYSIS: ICD-10-CM

## 2017-11-06 DIAGNOSIS — Z98.89 OTHER SPECIFIED POSTPROCEDURAL STATES: Chronic | ICD-10-CM

## 2017-11-06 LAB
GRAM STN FLD: SIGNIFICANT CHANGE UP
NIGHT BLUE STAIN TISS: SIGNIFICANT CHANGE UP
SPECIMEN SOURCE: SIGNIFICANT CHANGE UP
SPECIMEN SOURCE: SIGNIFICANT CHANGE UP

## 2017-11-06 PROCEDURE — 88305 TISSUE EXAM BY PATHOLOGIST: CPT

## 2017-11-06 PROCEDURE — 88112 CYTOPATH CELL ENHANCE TECH: CPT | Mod: 26

## 2017-11-06 PROCEDURE — 87015 SPECIMEN INFECT AGNT CONCNTJ: CPT

## 2017-11-06 PROCEDURE — 88312 SPECIAL STAINS GROUP 1: CPT | Mod: 26

## 2017-11-06 PROCEDURE — 88305 TISSUE EXAM BY PATHOLOGIST: CPT | Mod: 26

## 2017-11-06 PROCEDURE — 87102 FUNGUS ISOLATION CULTURE: CPT

## 2017-11-06 PROCEDURE — 87070 CULTURE OTHR SPECIMN AEROBIC: CPT

## 2017-11-06 PROCEDURE — 88312 SPECIAL STAINS GROUP 1: CPT

## 2017-11-06 PROCEDURE — 87206 SMEAR FLUORESCENT/ACID STAI: CPT

## 2017-11-06 PROCEDURE — 31624 DX BRONCHOSCOPE/LAVAGE: CPT | Mod: 50

## 2017-11-06 PROCEDURE — 87116 MYCOBACTERIA CULTURE: CPT

## 2017-11-06 PROCEDURE — 88112 CYTOPATH CELL ENHANCE TECH: CPT

## 2017-11-07 LAB — NON-GYNECOLOGICAL CYTOLOGY STUDY: SIGNIFICANT CHANGE UP

## 2017-11-08 LAB
CULTURE RESULTS: SIGNIFICANT CHANGE UP
SPECIMEN SOURCE: SIGNIFICANT CHANGE UP

## 2017-11-28 ENCOUNTER — APPOINTMENT (OUTPATIENT)
Dept: GASTROENTEROLOGY | Facility: AMBULATORY MEDICAL SERVICES | Age: 82
End: 2017-11-28
Payer: MEDICARE

## 2017-11-28 PROCEDURE — 43239 EGD BIOPSY SINGLE/MULTIPLE: CPT

## 2017-12-12 LAB
CULTURE RESULTS: SIGNIFICANT CHANGE UP
SPECIMEN SOURCE: SIGNIFICANT CHANGE UP

## 2017-12-27 LAB
CULTURE RESULTS: SIGNIFICANT CHANGE UP
SPECIMEN SOURCE: SIGNIFICANT CHANGE UP

## 2018-02-14 NOTE — DISCHARGE NOTE ADULT - CARE PLAN
None Principal Discharge DX:	Hemoptysis  Goal:	Resolution  Instructions for follow-up, activity and diet:	Complete antibiotics and prednisone  Use symbicort twice daily, every day, even if you feel well. Rinse your mouth with water after use.  Use albuterol inhaler if you have shortness of breath of wheezing. If you need to use the albuterol more than 2 or 3 times per day, for several days, call your doctor.  Stop smoking.  Secondary Diagnosis:	Smoker  Goal:	Stop smoking  Instructions for follow-up, activity and diet:	Use nicotine patches, or a stop smoking aid. Support groups are available. You may call the [facility] Tobacco Control Center for additional information.  Secondary Diagnosis:	Essential hypertension  Goal:	Maintain blood pressure less than 140/90  Secondary Diagnosis:	COPD (chronic obstructive pulmonary disease)  Goal:	Maintain optimal respiratory status.  Instructions for follow-up, activity and diet:	Complete antibiotics and prednisone  Use symbicort twice daily, every day, even if you feel well. Rinse your mouth with water after use.  Use albuterol inhaler if you have shortness of breath of wheezing. If you need to use the albuterol more than 2 or 3 times per day, for several days, call your doctor.  Stop smoking. Principal Discharge DX:	Hemoptysis  Goal:	Resolution  Instructions for follow-up, activity and diet:	Complete antibiotics and prednisone  Use symbicort twice daily, every day, even if you feel well. Rinse your mouth with water after use.  Use albuterol inhaler if you have shortness of breath of wheezing. If you need to use the albuterol more than 2 or 3 times per day, for several days, call your doctor.  Stop smoking.  Secondary Diagnosis:	Smoker  Goal:	Stop smoking  Instructions for follow-up, activity and diet:	Use nicotine patches, or a stop smoking aid. Support groups are available. You may call the [facility] Tobacco Control Center for additional information.  Secondary Diagnosis:	Essential hypertension  Goal:	Maintain blood pressure less than 140/90  Instructions for follow-up, activity and diet:	You were not given your Cardizem while inpatient. Follow up with your primary care provider to discuss restarting your Cardizem  Secondary Diagnosis:	COPD (chronic obstructive pulmonary disease)  Goal:	Maintain optimal respiratory status.  Instructions for follow-up, activity and diet:	Complete antibiotics and prednisone  Use symbicort twice daily, every day, even if you feel well. Rinse your mouth with water after use.  Use albuterol inhaler if you have shortness of breath of wheezing. If you need to use the albuterol more than 2 or 3 times per day, for several days, call your doctor.  Stop smoking.  Secondary Diagnosis:	Pneumonia  Goal:	Resolution  Instructions for follow-up, activity and diet:	Complete antibiotics and prednisone  Use symbicort twice daily, every day, even if you feel well. Rinse your mouth with water after use.  Use albuterol inhaler if you have shortness of breath of wheezing. If you need to use the albuterol more than 2 or 3 times per day, for several days, call your doctor.  Stop smoking.

## 2018-07-25 PROBLEM — Z78.9 ALCOHOL USE: Status: ACTIVE | Noted: 2017-11-02

## 2018-07-31 ENCOUNTER — RECORD ABSTRACTING (OUTPATIENT)
Age: 83
End: 2018-07-31

## 2018-07-31 DIAGNOSIS — J15.8 PNEUMONIA DUE TO OTHER SPECIFIED BACTERIA: ICD-10-CM

## 2018-07-31 DIAGNOSIS — I05.0 RHEUMATIC MITRAL STENOSIS: ICD-10-CM

## 2018-08-20 ENCOUNTER — APPOINTMENT (OUTPATIENT)
Dept: PULMONOLOGY | Facility: CLINIC | Age: 83
End: 2018-08-20
Payer: MEDICARE

## 2018-08-20 VITALS
HEART RATE: 63 BPM | DIASTOLIC BLOOD PRESSURE: 81 MMHG | HEIGHT: 60 IN | OXYGEN SATURATION: 95 % | BODY MASS INDEX: 32.39 KG/M2 | TEMPERATURE: 98.6 F | SYSTOLIC BLOOD PRESSURE: 140 MMHG | WEIGHT: 165 LBS | RESPIRATION RATE: 16 BRPM

## 2018-08-20 PROCEDURE — 99213 OFFICE O/P EST LOW 20 MIN: CPT | Mod: 25

## 2018-08-20 PROCEDURE — 71046 X-RAY EXAM CHEST 2 VIEWS: CPT

## 2018-08-20 PROCEDURE — 94060 EVALUATION OF WHEEZING: CPT

## 2018-10-29 ENCOUNTER — RX RENEWAL (OUTPATIENT)
Age: 83
End: 2018-10-29

## 2018-11-07 ENCOUNTER — RX RENEWAL (OUTPATIENT)
Age: 83
End: 2018-11-07

## 2018-12-05 ENCOUNTER — APPOINTMENT (OUTPATIENT)
Dept: PULMONOLOGY | Facility: CLINIC | Age: 83
End: 2018-12-05
Payer: MEDICARE

## 2018-12-05 VITALS
DIASTOLIC BLOOD PRESSURE: 91 MMHG | HEART RATE: 81 BPM | SYSTOLIC BLOOD PRESSURE: 150 MMHG | OXYGEN SATURATION: 96 % | TEMPERATURE: 98.7 F

## 2018-12-05 DIAGNOSIS — R05 COUGH: ICD-10-CM

## 2018-12-05 PROCEDURE — 94060 EVALUATION OF WHEEZING: CPT

## 2018-12-05 PROCEDURE — 99213 OFFICE O/P EST LOW 20 MIN: CPT | Mod: 25

## 2018-12-05 RX ORDER — DILTIAZEM HYDROCHLORIDE 120 MG/1
120 CAPSULE, EXTENDED RELEASE ORAL
Qty: 90 | Refills: 0 | Status: ACTIVE | COMMUNITY
Start: 2018-07-11

## 2019-05-28 ENCOUNTER — RX RENEWAL (OUTPATIENT)
Age: 84
End: 2019-05-28

## 2019-06-26 ENCOUNTER — APPOINTMENT (OUTPATIENT)
Dept: PULMONOLOGY | Facility: CLINIC | Age: 84
End: 2019-06-26

## 2019-07-15 ENCOUNTER — RX RENEWAL (OUTPATIENT)
Age: 84
End: 2019-07-15

## 2019-07-29 ENCOUNTER — APPOINTMENT (OUTPATIENT)
Dept: PULMONOLOGY | Facility: CLINIC | Age: 84
End: 2019-07-29
Payer: MEDICARE

## 2019-07-29 VITALS
HEART RATE: 75 BPM | DIASTOLIC BLOOD PRESSURE: 80 MMHG | OXYGEN SATURATION: 93 % | SYSTOLIC BLOOD PRESSURE: 123 MMHG | TEMPERATURE: 98.1 F

## 2019-07-29 VITALS
SYSTOLIC BLOOD PRESSURE: 114 MMHG | WEIGHT: 165 LBS | HEIGHT: 60 IN | HEART RATE: 73 BPM | BODY MASS INDEX: 32.39 KG/M2 | TEMPERATURE: 98 F | DIASTOLIC BLOOD PRESSURE: 77 MMHG | OXYGEN SATURATION: 93 % | RESPIRATION RATE: 16 BRPM

## 2019-07-29 PROCEDURE — 94727 GAS DIL/WSHOT DETER LNG VOL: CPT

## 2019-07-29 PROCEDURE — 94060 EVALUATION OF WHEEZING: CPT

## 2019-07-29 PROCEDURE — 99214 OFFICE O/P EST MOD 30 MIN: CPT | Mod: 25

## 2019-07-29 PROCEDURE — 71046 X-RAY EXAM CHEST 2 VIEWS: CPT

## 2019-07-29 PROCEDURE — 94729 DIFFUSING CAPACITY: CPT

## 2019-07-29 NOTE — PROCEDURE
[FreeTextEntry1] : Chest x-ray PA lateral July 9, 2019\par Cardiomegaly\par Consultation aortic knob\par Clear lung fields without parenchymal infiltrates pleural effusions dominant pulmonary nodules.\par Kyphosis with thinning vertebral spine\par \par PFT July 29, 2019\par Normal flow rates\par Minimal obstructive pattern with FEV1 FVC 73\par Normal lung lines with total capacity 96% predicted.\par Moderate reduction diffusion 63% of predicted with a loss of functioning alveolocapillary units.\par \par \par Chest x-ray PA and lateral projection Aug 2018\par Significant cardiomegaly demonstrated\par Very minimal calcification of the aortic knob\par Fields are grossly clear no appreciated pulmonary nodule\par Negative pleural effusion\par Soft tissue bony structures demonstrate a kyphoscoliosis\par \par PFT Aug 2018\par  demonstrates well preserved forward with normal spirometry normal lung findings\par Moderate diffusion abnormality 60% of predicted\par \par CT chest 5/16/2017\par Stable left upper lobe 5 mm pulmonary nodule dating back to 2012\par Centrilobular emphysema\par Bilateral scattered areas of linear/subsegmental atelectasis

## 2019-07-29 NOTE — PHYSICAL EXAM
[Normal Appearance] : normal appearance [General Appearance - Well Developed] : well developed [Well Groomed] : well groomed [General Appearance - In No Acute Distress] : no acute distress [No Deformities] : no deformities [General Appearance - Well Nourished] : well nourished [Eyelids - No Xanthelasma] : the eyelids demonstrated no xanthelasmas [Normal Conjunctiva] : the conjunctiva exhibited no abnormalities [Normal Oropharynx] : normal oropharynx [I] : I [Neck Cervical Mass (___cm)] : no neck mass was observed [Neck Appearance] : the appearance of the neck was normal [Jugular Venous Distention Increased] : there was no jugular-venous distention [Thyroid Diffuse Enlargement] : the thyroid was not enlarged [Thyroid Nodule] : there were no palpable thyroid nodules [Heart Rate And Rhythm] : heart rate and rhythm were normal [Murmurs] : no murmurs present [Heart Sounds] : normal S1 and S2 [Arterial Pulses Normal] : the arterial pulses were normal [Edema] : no peripheral edema present [Exaggerated Use Of Accessory Muscles For Inspiration] : no accessory muscle use [Respiration, Rhythm And Depth] : normal respiratory rhythm and effort [Chest Palpation] : palpation of the chest revealed no abnormalities [Auscultation Breath Sounds / Voice Sounds] : lungs were clear to auscultation bilaterally [Abdomen Soft] : soft [Lungs Percussion] : the lungs were normal to percussion [Bowel Sounds] : normal bowel sounds [Abdomen Mass (___ Cm)] : no abdominal mass palpated [Abdomen Tenderness] : non-tender [Cyanosis, Localized] : no localized cyanosis [Nail Clubbing] : no clubbing of the fingernails [Petechial Hemorrhages (___cm)] : no petechial hemorrhages [Deep Tendon Reflexes (DTR)] : deep tendon reflexes were 2+ and symmetric [Sensation] : the sensory exam was normal to light touch and pinprick [] : no ischemic changes [No Focal Deficits] : no focal deficits [Oriented To Time, Place, And Person] : oriented to person, place, and time [Impaired Insight] : insight and judgment were intact [Affect] : the affect was normal [FreeTextEntry1] : non icteric

## 2019-07-29 NOTE — HISTORY OF PRESENT ILLNESS
[Stable] : are stable [Cough] : stable coughing [Feelings Of Weakness On Exertion] : stable exercise intolerance [Difficulty Breathing During Exertion] : stable dyspnea on exertion [Regional Soft Tissue Swelling Both Lower Extremities] : denies lower extremity edema [Chest Pain Or Discomfort] : denies chest pain [Fever] : denies fever [Wheezing] : denies wheezing [Wt Gain ___ Lbs] : no recent weight gain [Wt Loss ___ Lbs] : no recent weight loss [FreeTextEntry1] : CT chest July 2017\par Serial serial comparison demonstrated stability of a 5 mm left lower lobe pulmonary nodule\par Stable MATIAS\par  No significant cough\par Denies hemoptysis \par

## 2019-07-29 NOTE — REVIEW OF SYSTEMS
[As Noted in HPI] : as noted in HPI [Negative] : Neurologic [Chest Discomfort] : no chest discomfort [PND] : no PND [Orthopnea] : no orthopnea [Palpitations] : no palpitations [Edema] : ~T edema was not present [Claudication] : no intermittent claudication [Leg Cramps] : no leg cramps

## 2019-07-29 NOTE — DISCUSSION/SUMMARY
[FreeTextEntry1] : COPD\par  Hx hemoptysis\par hx mild MS\par Hx pulmonary nodule\par based on last CT CHEST no indication f/u CT scans\par rx updated

## 2019-10-02 ENCOUNTER — RX RENEWAL (OUTPATIENT)
Age: 84
End: 2019-10-02

## 2019-11-13 NOTE — PROGRESS NOTE ADULT - SUBJECTIVE AND OBJECTIVE BOX
PULMONARY PROGRESS NOTE    MARIVEL LONGORIA  MRN-[id number]    Patient is a 86y old  Female who presents with a chief complaint of hemoptysis (09 Oct 2017 23:40)    no hemoptysis, denies sob or cough, feels ready to go home.    ROS:   -denies N/V/D/dysuria    MEDICATIONS  (STANDING):  azithromycin  IVPB 500 milliGRAM(s) IV Intermittent every 24 hours  buDESOnide 160 MICROgram(s)/formoterol 4.5 MICROgram(s) Inhaler 2 Puff(s) Inhalation two times a day  cefTRIAXone   IVPB 1 Gram(s) IV Intermittent every 24 hours  enoxaparin Injectable 40 milliGRAM(s) SubCutaneous every 24 hours  nicotine -  14 mG/24Hr(s) Patch 1 patch Transdermal daily  predniSONE   Tablet 40 milliGRAM(s) Oral daily    MEDICATIONS  (PRN):  acetaminophen   Tablet 650 milliGRAM(s) Oral every 6 hours PRN For Temp greater than 38 C (100.4 F)  ALBUTerol/ipratropium for Nebulization 3 milliLiter(s) Nebulizer every 6 hours PRN Shortness of Breath and/or Wheezing  benzonatate 100 milliGRAM(s) Oral three times a day PRN Cough      Vital Signs Last 24 Hrs  T(C): 36.4 (12 Oct 2017 06:10), Max: 37.2 (11 Oct 2017 21:22)  T(F): 97.6 (12 Oct 2017 06:10), Max: 98.9 (11 Oct 2017 21:22)  HR: 70 (12 Oct 2017 06:10) (70 - 87)  BP: 142/75 (12 Oct 2017 06:10) (121/59 - 144/84)  BP(mean): --  RR: 18 (12 Oct 2017 06:10) (18 - 18)  SpO2: 97% (12 Oct 2017 06:10) (94% - 97%)    GENERAL: The patient is awake and alert in no apparent distress.     LUNGS: clear lungs    HEART: S1/S2        LABS/IMGAING: reviewed                                     13.1   8.21  )-----------( 196      ( 12 Oct 2017 06:20 )             37.1     10-11    144  |  106  |  20  ----------------------------<  106<H>  4.7   |  23  |  0.94    Ca    8.8      11 Oct 2017 06:00          < from: CT Angio Chest w/ IV Cont (10.09.17 @ 19:08) >  IMPRESSION:     Airspace opacities the left lower lobe which are new when compared to   7/13/2017 and likely represent an infectious etiology. Follow-up to   resolution is should be obtained.    No evidence of pulmonary embolism.    < end of copied text >      PROBLEM LIST:  86y Female with HEALTH ISSUES - PROBLEM Dx:  COPD   pneumonia  hemoptysis  Hyperlipidemia  Essential hypertension    RECS:  -No further hemoptysis, continue to monitor   -Continue abx for 5 days  -Continue prednisone 40mg PO daily x 5 days for COPD exacerbation  -symbicort BID, duonebs PRN  -No pulmonary contraindication to discharge home today  -will need follow up with  in 1 -2 weeks    Cheryle Holt MD  [phone] Unable to assess

## 2019-12-16 ENCOUNTER — RX RENEWAL (OUTPATIENT)
Age: 84
End: 2019-12-16

## 2019-12-18 ENCOUNTER — APPOINTMENT (OUTPATIENT)
Dept: PULMONOLOGY | Facility: CLINIC | Age: 84
End: 2019-12-18
Payer: MEDICARE

## 2019-12-18 VITALS
TEMPERATURE: 98.2 F | DIASTOLIC BLOOD PRESSURE: 80 MMHG | HEART RATE: 74 BPM | SYSTOLIC BLOOD PRESSURE: 140 MMHG | OXYGEN SATURATION: 95 %

## 2019-12-18 PROCEDURE — 94060 EVALUATION OF WHEEZING: CPT

## 2019-12-18 PROCEDURE — 88738 HGB QUANT TRANSCUTANEOUS: CPT

## 2019-12-18 PROCEDURE — 94729 DIFFUSING CAPACITY: CPT

## 2019-12-18 PROCEDURE — 94727 GAS DIL/WSHOT DETER LNG VOL: CPT

## 2019-12-18 PROCEDURE — 99214 OFFICE O/P EST MOD 30 MIN: CPT | Mod: 25

## 2019-12-18 NOTE — PROCEDURE
[FreeTextEntry1] : PFT 12 /18/2019 will preserve normal flow rates\par Lung volumes normal\par Diffusion 75% of predicted.\par Hemoglobin 13.7\par \par Chest x-ray PA lateral July 9, 2019\par Cardiomegaly\par Consultation aortic knob\par Clear lung fields without parenchymal infiltrates pleural effusions dominant pulmonary nodules.\par Kyphosis with thinning vertebral spine\par \par Chest x-ray PA and lateral projection Aug 2018\par Significant cardiomegaly demonstrated\par Very minimal calcification of the aortic knob\par Fields are grossly clear no appreciated pulmonary nodule\par Negative pleural effusion\par Soft tissue bony structures demonstrate a kyphoscoliosis\par \par CT chest 5/16/2017\par Stable left upper lobe 5 mm pulmonary nodule dating back to 2012\par Centrilobular emphysema\par Bilateral scattered areas of linear/subsegmental atelectasis

## 2019-12-18 NOTE — PHYSICAL EXAM
[General Appearance - Well Developed] : well developed [Normal Appearance] : normal appearance [Well Groomed] : well groomed [General Appearance - Well Nourished] : well nourished [General Appearance - In No Acute Distress] : no acute distress [No Deformities] : no deformities [Normal Conjunctiva] : the conjunctiva exhibited no abnormalities [Normal Oropharynx] : normal oropharynx [Eyelids - No Xanthelasma] : the eyelids demonstrated no xanthelasmas [Neck Appearance] : the appearance of the neck was normal [I] : I [Thyroid Diffuse Enlargement] : the thyroid was not enlarged [Jugular Venous Distention Increased] : there was no jugular-venous distention [Neck Cervical Mass (___cm)] : no neck mass was observed [Heart Rate And Rhythm] : heart rate and rhythm were normal [Thyroid Nodule] : there were no palpable thyroid nodules [Heart Sounds] : normal S1 and S2 [Murmurs] : no murmurs present [Arterial Pulses Normal] : the arterial pulses were normal [Edema] : no peripheral edema present [Respiration, Rhythm And Depth] : normal respiratory rhythm and effort [Auscultation Breath Sounds / Voice Sounds] : lungs were clear to auscultation bilaterally [Exaggerated Use Of Accessory Muscles For Inspiration] : no accessory muscle use [Lungs Percussion] : the lungs were normal to percussion [Chest Palpation] : palpation of the chest revealed no abnormalities [Bowel Sounds] : normal bowel sounds [Abdomen Soft] : soft [Abdomen Mass (___ Cm)] : no abdominal mass palpated [Abdomen Tenderness] : non-tender [Cyanosis, Localized] : no localized cyanosis [Nail Clubbing] : no clubbing of the fingernails [Petechial Hemorrhages (___cm)] : no petechial hemorrhages [] : no ischemic changes [Sensation] : the sensory exam was normal to light touch and pinprick [Deep Tendon Reflexes (DTR)] : deep tendon reflexes were 2+ and symmetric [No Focal Deficits] : no focal deficits [Oriented To Time, Place, And Person] : oriented to person, place, and time [Impaired Insight] : insight and judgment were intact [Affect] : the affect was normal [FreeTextEntry1] : non icteric

## 2019-12-18 NOTE — REVIEW OF SYSTEMS
[As Noted in HPI] : as noted in HPI [Negative] : Genitourinary/GYN [Chest Discomfort] : no chest discomfort [PND] : no PND [Palpitations] : no palpitations [Orthopnea] : no orthopnea [Edema] : ~T edema was not present [Claudication] : no intermittent claudication [Leg Cramps] : no leg cramps

## 2019-12-18 NOTE — HISTORY OF PRESENT ILLNESS
[Stable] : are stable [Cough] : stable coughing [Difficulty Breathing During Exertion] : stable dyspnea on exertion [Feelings Of Weakness On Exertion] : stable exercise intolerance [Regional Soft Tissue Swelling Both Lower Extremities] : denies lower extremity edema [Chest Pain Or Discomfort] : denies chest pain [Wheezing] : denies wheezing [Fever] : denies fever [Wt Gain ___ Lbs] : no recent weight gain [Wt Loss ___ Lbs] : no recent weight loss [FreeTextEntry1] : CT chest July 2017\par Serial serial comparison demonstrated stability of a 5 mm left lower lobe pulmonary nodule\par Stable MATIAS\par  No significant cough\par Denies hemoptysis \par

## 2019-12-18 NOTE — REVIEW OF SYSTEMS
[As Noted in HPI] : as noted in HPI [Negative] : Neurologic [Chest Discomfort] : no chest discomfort [PND] : no PND [Orthopnea] : no orthopnea [Edema] : ~T edema was not present [Palpitations] : no palpitations [Leg Cramps] : no leg cramps [Claudication] : no intermittent claudication

## 2019-12-18 NOTE — PHYSICAL EXAM
[General Appearance - Well Developed] : well developed [General Appearance - Well Nourished] : well nourished [Normal Appearance] : normal appearance [Well Groomed] : well groomed [No Deformities] : no deformities [General Appearance - In No Acute Distress] : no acute distress [Normal Conjunctiva] : the conjunctiva exhibited no abnormalities [Eyelids - No Xanthelasma] : the eyelids demonstrated no xanthelasmas [Normal Oropharynx] : normal oropharynx [I] : I [Neck Appearance] : the appearance of the neck was normal [Thyroid Diffuse Enlargement] : the thyroid was not enlarged [Jugular Venous Distention Increased] : there was no jugular-venous distention [Neck Cervical Mass (___cm)] : no neck mass was observed [Heart Sounds] : normal S1 and S2 [Heart Rate And Rhythm] : heart rate and rhythm were normal [Thyroid Nodule] : there were no palpable thyroid nodules [Murmurs] : no murmurs present [Edema] : no peripheral edema present [Arterial Pulses Normal] : the arterial pulses were normal [Respiration, Rhythm And Depth] : normal respiratory rhythm and effort [Chest Palpation] : palpation of the chest revealed no abnormalities [Exaggerated Use Of Accessory Muscles For Inspiration] : no accessory muscle use [Lungs Percussion] : the lungs were normal to percussion [Auscultation Breath Sounds / Voice Sounds] : lungs were clear to auscultation bilaterally [Abdomen Soft] : soft [Bowel Sounds] : normal bowel sounds [Abdomen Tenderness] : non-tender [Abdomen Mass (___ Cm)] : no abdominal mass palpated [Petechial Hemorrhages (___cm)] : no petechial hemorrhages [Nail Clubbing] : no clubbing of the fingernails [Cyanosis, Localized] : no localized cyanosis [Deep Tendon Reflexes (DTR)] : deep tendon reflexes were 2+ and symmetric [Sensation] : the sensory exam was normal to light touch and pinprick [] : no ischemic changes [No Focal Deficits] : no focal deficits [Impaired Insight] : insight and judgment were intact [Oriented To Time, Place, And Person] : oriented to person, place, and time [Affect] : the affect was normal [FreeTextEntry1] : non icteric

## 2019-12-18 NOTE — DISCUSSION/SUMMARY
[FreeTextEntry1] : COPD-centrilobular emphysema\par  Hx hemoptysis no recurrence\par hx mild MS\par Hx pulmonary nodule\par based on last CT CHEST no indication f/u CT scans\par If CAT scan chest completed 5/16/2017 which demonstrated a stable left upper lobe 5 mm pulmonary nodule dating back to scans from 2012\par rx updated\par on Symbicort with instructions to rinse

## 2019-12-18 NOTE — HISTORY OF PRESENT ILLNESS
[Stable] : are stable [Difficulty Breathing During Exertion] : stable dyspnea on exertion [Feelings Of Weakness On Exertion] : stable exercise intolerance [Cough] : stable coughing [Wheezing] : denies wheezing [Chest Pain Or Discomfort] : denies chest pain [Regional Soft Tissue Swelling Both Lower Extremities] : denies lower extremity edema [Fever] : denies fever [Wt Gain ___ Lbs] : no recent weight gain [Wt Loss ___ Lbs] : no recent weight loss [FreeTextEntry1] : CT chest July 2017\par Serial serial comparison demonstrated stability of a 5 mm left lower lobe pulmonary nodule\par Stable MATIAS\par  No significant cough\par Denies hemoptysis \par

## 2020-06-19 ENCOUNTER — TRANSCRIPTION ENCOUNTER (OUTPATIENT)
Age: 85
End: 2020-06-19

## 2020-07-01 ENCOUNTER — APPOINTMENT (OUTPATIENT)
Dept: PULMONOLOGY | Facility: CLINIC | Age: 85
End: 2020-07-01
Payer: MEDICARE

## 2020-07-01 VITALS
RESPIRATION RATE: 16 BRPM | HEART RATE: 82 BPM | OXYGEN SATURATION: 95 % | DIASTOLIC BLOOD PRESSURE: 80 MMHG | WEIGHT: 168 LBS | SYSTOLIC BLOOD PRESSURE: 147 MMHG | BODY MASS INDEX: 32.98 KG/M2 | HEIGHT: 60 IN

## 2020-07-01 DIAGNOSIS — Z20.828 CONTACT WITH AND (SUSPECTED) EXPOSURE TO OTHER VIRAL COMMUNICABLE DISEASES: ICD-10-CM

## 2020-07-01 PROCEDURE — 99214 OFFICE O/P EST MOD 30 MIN: CPT | Mod: 25

## 2020-07-01 PROCEDURE — 71046 X-RAY EXAM CHEST 2 VIEWS: CPT

## 2020-07-01 RX ORDER — FLUTICASONE PROPIONATE AND SALMETEROL 250; 50 UG/1; UG/1
250-50 POWDER RESPIRATORY (INHALATION)
Qty: 1 | Refills: 3 | Status: DISCONTINUED | COMMUNITY
Start: 2020-03-09 | End: 2020-07-01

## 2020-07-01 RX ORDER — BUDESONIDE AND FORMOTEROL FUMARATE DIHYDRATE 160; 4.5 UG/1; UG/1
160-4.5 AEROSOL RESPIRATORY (INHALATION)
Qty: 1 | Refills: 3 | Status: DISCONTINUED | COMMUNITY
Start: 2020-05-01 | End: 2020-07-01

## 2020-07-01 RX ORDER — BUDESONIDE AND FORMOTEROL FUMARATE DIHYDRATE 160; 4.5 UG/1; UG/1
160-4.5 AEROSOL RESPIRATORY (INHALATION) TWICE DAILY
Qty: 3 | Refills: 3 | Status: DISCONTINUED | COMMUNITY
Start: 2018-08-20 | End: 2020-07-01

## 2020-07-01 NOTE — REVIEW OF SYSTEMS
[As Noted in HPI] : as noted in HPI [Negative] : Neurologic [Chest Discomfort] : no chest discomfort [PND] : no PND [Orthopnea] : no orthopnea [Palpitations] : no palpitations [Claudication] : no intermittent claudication [Edema] : ~T edema was not present [Leg Cramps] : no leg cramps

## 2020-07-01 NOTE — HISTORY OF PRESENT ILLNESS
[Stable] : are stable [Feelings Of Weakness On Exertion] : stable exercise intolerance [Difficulty Breathing During Exertion] : stable dyspnea on exertion [Cough] : stable coughing [Regional Soft Tissue Swelling Both Lower Extremities] : denies lower extremity edema [Wheezing] : denies wheezing [Chest Pain Or Discomfort] : denies chest pain [Fever] : denies fever [Wt Gain ___ Lbs] : no recent weight gain [Wt Loss ___ Lbs] : no recent weight loss [FreeTextEntry1] : CT chest July 2017\par Serial serial comparison demonstrated stability of a 5 mm left lower lobe pulmonary nodule\par Stable MATIAS\par  No significant cough\par Denies hemoptysis \par

## 2020-07-01 NOTE — PHYSICAL EXAM
[General Appearance - Well Developed] : well developed [Normal Appearance] : normal appearance [Well Groomed] : well groomed [General Appearance - Well Nourished] : well nourished [No Deformities] : no deformities [General Appearance - In No Acute Distress] : no acute distress [Normal Conjunctiva] : the conjunctiva exhibited no abnormalities [Eyelids - No Xanthelasma] : the eyelids demonstrated no xanthelasmas [Normal Oropharynx] : normal oropharynx [Neck Appearance] : the appearance of the neck was normal [I] : I [Jugular Venous Distention Increased] : there was no jugular-venous distention [Neck Cervical Mass (___cm)] : no neck mass was observed [Heart Rate And Rhythm] : heart rate and rhythm were normal [Thyroid Nodule] : there were no palpable thyroid nodules [Thyroid Diffuse Enlargement] : the thyroid was not enlarged [Heart Sounds] : normal S1 and S2 [Murmurs] : no murmurs present [Arterial Pulses Normal] : the arterial pulses were normal [Edema] : no peripheral edema present [Exaggerated Use Of Accessory Muscles For Inspiration] : no accessory muscle use [Auscultation Breath Sounds / Voice Sounds] : lungs were clear to auscultation bilaterally [Respiration, Rhythm And Depth] : normal respiratory rhythm and effort [Bowel Sounds] : normal bowel sounds [Chest Palpation] : palpation of the chest revealed no abnormalities [Lungs Percussion] : the lungs were normal to percussion [Abdomen Mass (___ Cm)] : no abdominal mass palpated [Abdomen Soft] : soft [Abdomen Tenderness] : non-tender [Petechial Hemorrhages (___cm)] : no petechial hemorrhages [Cyanosis, Localized] : no localized cyanosis [Nail Clubbing] : no clubbing of the fingernails [Deep Tendon Reflexes (DTR)] : deep tendon reflexes were 2+ and symmetric [] : no ischemic changes [Oriented To Time, Place, And Person] : oriented to person, place, and time [No Focal Deficits] : no focal deficits [Sensation] : the sensory exam was normal to light touch and pinprick [Affect] : the affect was normal [Impaired Insight] : insight and judgment were intact [FreeTextEntry1] : non icteric

## 2020-07-01 NOTE — PROCEDURE
[FreeTextEntry1] : \par \par Chest x-ray PA July 1, 2020\par Cardiomegaly\par Mild calcification aortic knob\par Blunting left costophrenic angle\par No evidence of parenchymal infiltrates pleural effusions dominant pulmonary nodules\par Soft tissue bony structures grossly normal\par Compared to chest x-ray of July 29, 2019 only interval change is the very mild blunting of the left costophrenic angle not previously appreciated \par Recommendation repeat chest x-ray 1 month\par PFT 12 /18/2019 will preserve normal flow rates\par Lung volumes normal\par Diffusion 75% of predicted.\par Hemoglobin 13.7\par \par Chest x-ray PA lateral July 9, 2019\par Cardiomegaly\par Consultation aortic knob\par Clear lung fields without parenchymal infiltrates pleural effusions dominant pulmonary nodules.\par Kyphosis with thinning vertebral spine\par \par Chest x-ray PA and lateral projection Aug 2018\par Significant cardiomegaly demonstrated\par Very minimal calcification of the aortic knob\par Fields are grossly clear no appreciated pulmonary nodule\par Negative pleural effusion\par Soft tissue bony structures demonstrate a kyphoscoliosis\par \par CT chest 5/16/2017\par Stable left upper lobe 5 mm pulmonary nodule dating back to 2012\par Centrilobular emphysema\par Bilateral scattered areas of linear/subsegmental atelectasis

## 2020-07-01 NOTE — DISCUSSION/SUMMARY
[FreeTextEntry1] : COPD-centrilobular emphysema\par Wanting left costophrenic angle-repeat chest x-ray 1 month\par  Hx hemoptysis no recurrence\par hx mild MS\par Hx pulmonary nodule\par based on last CT CHEST no indication f/u CT scans\par If CAT scan chest completed 5/16/2017 which demonstrated a stable left upper lobe 5 mm pulmonary nodule dating back to scans from 2012\par rx updated\par on Symbicort with instructions to rinse\par PFT next visit

## 2020-07-02 RX ORDER — BUDESONIDE AND FORMOTEROL FUMARATE DIHYDRATE 160; 4.5 UG/1; UG/1
160-4.5 AEROSOL RESPIRATORY (INHALATION) TWICE DAILY
Qty: 1 | Refills: 3 | Status: ACTIVE | COMMUNITY
Start: 1900-01-01 | End: 1900-01-01

## 2020-07-24 ENCOUNTER — NON-APPOINTMENT (OUTPATIENT)
Age: 85
End: 2020-07-24

## 2020-07-24 ENCOUNTER — APPOINTMENT (OUTPATIENT)
Dept: OPHTHALMOLOGY | Facility: CLINIC | Age: 85
End: 2020-07-24
Payer: MEDICARE

## 2020-07-24 PROCEDURE — 92015 DETERMINE REFRACTIVE STATE: CPT

## 2020-07-24 PROCEDURE — 92014 COMPRE OPH EXAM EST PT 1/>: CPT

## 2020-08-01 DIAGNOSIS — Z01.818 ENCOUNTER FOR OTHER PREPROCEDURAL EXAMINATION: ICD-10-CM

## 2020-08-02 ENCOUNTER — APPOINTMENT (OUTPATIENT)
Dept: DISASTER EMERGENCY | Facility: CLINIC | Age: 85
End: 2020-08-02

## 2020-08-03 LAB — SARS-COV-2 N GENE NPH QL NAA+PROBE: NOT DETECTED

## 2020-08-05 ENCOUNTER — APPOINTMENT (OUTPATIENT)
Dept: PULMONOLOGY | Facility: CLINIC | Age: 85
End: 2020-08-05

## 2020-08-06 ENCOUNTER — APPOINTMENT (OUTPATIENT)
Dept: PULMONOLOGY | Facility: CLINIC | Age: 85
End: 2020-08-06
Payer: MEDICARE

## 2020-08-06 VITALS
WEIGHT: 167 LBS | HEART RATE: 87 BPM | OXYGEN SATURATION: 98 % | BODY MASS INDEX: 32.79 KG/M2 | SYSTOLIC BLOOD PRESSURE: 133 MMHG | TEMPERATURE: 97.7 F | DIASTOLIC BLOOD PRESSURE: 84 MMHG | HEIGHT: 60 IN | RESPIRATION RATE: 16 BRPM

## 2020-08-06 LAB — POCT - HEMOGLOBIN (HGB), QUANTITATIVE, TRANSCUTANEOUS: 12.9

## 2020-08-06 PROCEDURE — ZZZZZ: CPT

## 2020-08-06 PROCEDURE — 94060 EVALUATION OF WHEEZING: CPT

## 2020-08-06 PROCEDURE — 99214 OFFICE O/P EST MOD 30 MIN: CPT | Mod: 25

## 2020-08-06 PROCEDURE — 94618 PULMONARY STRESS TESTING: CPT

## 2020-08-06 PROCEDURE — 94729 DIFFUSING CAPACITY: CPT

## 2020-08-06 PROCEDURE — 71046 X-RAY EXAM CHEST 2 VIEWS: CPT

## 2020-08-06 PROCEDURE — 94727 GAS DIL/WSHOT DETER LNG VOL: CPT

## 2020-08-06 PROCEDURE — 88738 HGB QUANT TRANSCUTANEOUS: CPT

## 2020-08-06 NOTE — HISTORY OF PRESENT ILLNESS
[Stable] : are stable [Difficulty Breathing During Exertion] : stable dyspnea on exertion [Feelings Of Weakness On Exertion] : stable exercise intolerance [Cough] : stable coughing [Wheezing] : denies wheezing [Regional Soft Tissue Swelling Both Lower Extremities] : denies lower extremity edema [Chest Pain Or Discomfort] : denies chest pain [Fever] : denies fever [Wt Gain ___ Lbs] : no recent weight gain [Wt Loss ___ Lbs] : no recent weight loss [FreeTextEntry1] : CT chest July 2017\par Serial serial comparison demonstrated stability of a 5 mm left lower lobe pulmonary nodule\par Stable MATIAS\par  No significant cough\par Denies hemoptysis \par

## 2020-08-06 NOTE — PROCEDURE
[FreeTextEntry1] : Chest x-ray PA lateral August 6, 2020\par Borderline cardiomegaly\par Mild right scoliosis\par Minimal calcification aortic knob\par Kyphosis noted as well as thinning vertebral spine\par No dominant pulmonary nodules parenchymal infiltrates or pleural effusions\par Prior noted blunting of the left costophrenic angle has resolved\par \par Pulmonary  6-minute walk step stress test August 6, 2020\par Room air Baseline O2 saturation 96%\par [place] desaturation to 91% at 6 minutes with an increased heart rate 209\par Resting recovery O2 saturation returned to 95% on room air and minute 8 with heart rate of 80\par Impression normal study\par No evidence of a exercise-induced hypoxemia that would require oxygen therapy\par \par PFT August 6, 2020\par Well-preserved flow rates\par Very minimal obstructive pattern\par Normal lung volumes with a total opacity 99% predicted.\par Mild reduction diffusion 70% of predicted with a minimal loss of functioning alveolocapillary units\par Hemoglobin 12.9\par \par Chest x-ray PA July 1, 2020\par Cardiomegaly\par Mild calcification aortic knob\par Blunting left costophrenic angle\par No evidence of parenchymal infiltrates pleural effusions dominant pulmonary nodules\par Soft tissue bony structures grossly normal\par Compared to chest x-ray of July 29, 2019 only interval change is the very mild blunting of the left costophrenic angle not previously appreciated \par Recommendation repeat chest x-ray 1 month\par \par PFT 12 /18/2019 will preserve normal flow rates\par Lung volumes normal\par Diffusion 75% of predicted.\par Hemoglobin 13.7\par \par Chest x-ray PA lateral July 9, 2019\par Cardiomegaly\par Consultation aortic knob\par Clear lung fields without parenchymal infiltrates pleural effusions dominant pulmonary nodules.\par Kyphosis with thinning vertebral spine\par \par Chest x-ray PA and lateral projection Aug 2018\par Significant cardiomegaly demonstrated\par Very minimal calcification of the aortic knob\par Fields are grossly clear no appreciated pulmonary nodule\par Negative pleural effusion\par Soft tissue bony structures demonstrate a kyphoscoliosis\par \par CT chest 5/16/2017\par Stable left upper lobe 5 mm pulmonary nodule dating back to 2012\par Centrilobular emphysema\par Bilateral scattered areas of linear/subsegmental atelectasis

## 2020-08-06 NOTE — PHYSICAL EXAM
[General Appearance - Well Developed] : well developed [Normal Appearance] : normal appearance [Well Groomed] : well groomed [General Appearance - Well Nourished] : well nourished [No Deformities] : no deformities [General Appearance - In No Acute Distress] : no acute distress [Normal Conjunctiva] : the conjunctiva exhibited no abnormalities [Eyelids - No Xanthelasma] : the eyelids demonstrated no xanthelasmas [Normal Oropharynx] : normal oropharynx [I] : I [Neck Appearance] : the appearance of the neck was normal [Neck Cervical Mass (___cm)] : no neck mass was observed [Jugular Venous Distention Increased] : there was no jugular-venous distention [Thyroid Diffuse Enlargement] : the thyroid was not enlarged [Thyroid Nodule] : there were no palpable thyroid nodules [Murmurs] : no murmurs present [Heart Rate And Rhythm] : heart rate and rhythm were normal [Heart Sounds] : normal S1 and S2 [Arterial Pulses Normal] : the arterial pulses were normal [Edema] : no peripheral edema present [Respiration, Rhythm And Depth] : normal respiratory rhythm and effort [Auscultation Breath Sounds / Voice Sounds] : lungs were clear to auscultation bilaterally [Exaggerated Use Of Accessory Muscles For Inspiration] : no accessory muscle use [Bowel Sounds] : normal bowel sounds [Chest Palpation] : palpation of the chest revealed no abnormalities [Lungs Percussion] : the lungs were normal to percussion [Abdomen Soft] : soft [Abdomen Tenderness] : non-tender [Nail Clubbing] : no clubbing of the fingernails [Abdomen Mass (___ Cm)] : no abdominal mass palpated [Cyanosis, Localized] : no localized cyanosis [] : no ischemic changes [Petechial Hemorrhages (___cm)] : no petechial hemorrhages [Deep Tendon Reflexes (DTR)] : deep tendon reflexes were 2+ and symmetric [Sensation] : the sensory exam was normal to light touch and pinprick [No Focal Deficits] : no focal deficits [Oriented To Time, Place, And Person] : oriented to person, place, and time [Impaired Insight] : insight and judgment were intact [Affect] : the affect was normal [FreeTextEntry1] : non icteric

## 2020-08-06 NOTE — REVIEW OF SYSTEMS
[As Noted in HPI] : as noted in HPI [Negative] : Gastrointestinal [Chest Discomfort] : no chest discomfort [PND] : no PND [Orthopnea] : no orthopnea [Edema] : ~T edema was not present [Claudication] : no intermittent claudication [Palpitations] : no palpitations [Leg Cramps] : no leg cramps

## 2020-11-05 ENCOUNTER — APPOINTMENT (OUTPATIENT)
Dept: PULMONOLOGY | Facility: CLINIC | Age: 85
End: 2020-11-05
Payer: MEDICARE

## 2020-11-05 VITALS
TEMPERATURE: 98.2 F | DIASTOLIC BLOOD PRESSURE: 80 MMHG | OXYGEN SATURATION: 96 % | SYSTOLIC BLOOD PRESSURE: 142 MMHG | HEART RATE: 88 BPM

## 2020-11-05 DIAGNOSIS — R91.1 SOLITARY PULMONARY NODULE: ICD-10-CM

## 2020-11-05 DIAGNOSIS — R04.2 HEMOPTYSIS: ICD-10-CM

## 2020-11-05 DIAGNOSIS — Z23 ENCOUNTER FOR IMMUNIZATION: ICD-10-CM

## 2020-11-05 PROCEDURE — 99072 ADDL SUPL MATRL&STAF TM PHE: CPT

## 2020-11-05 PROCEDURE — 71046 X-RAY EXAM CHEST 2 VIEWS: CPT

## 2020-11-05 PROCEDURE — 90732 PPSV23 VACC 2 YRS+ SUBQ/IM: CPT

## 2020-11-05 PROCEDURE — G0009: CPT

## 2020-11-05 PROCEDURE — 99214 OFFICE O/P EST MOD 30 MIN: CPT | Mod: 25

## 2020-11-05 NOTE — PHYSICAL EXAM
[General Appearance - Well Developed] : well developed [Normal Appearance] : normal appearance [Well Groomed] : well groomed [General Appearance - Well Nourished] : well nourished [No Deformities] : no deformities [General Appearance - In No Acute Distress] : no acute distress [Normal Conjunctiva] : the conjunctiva exhibited no abnormalities [Eyelids - No Xanthelasma] : the eyelids demonstrated no xanthelasmas [Normal Oropharynx] : normal oropharynx [I] : I [Neck Appearance] : the appearance of the neck was normal [Neck Cervical Mass (___cm)] : no neck mass was observed [Jugular Venous Distention Increased] : there was no jugular-venous distention [Thyroid Diffuse Enlargement] : the thyroid was not enlarged [Thyroid Nodule] : there were no palpable thyroid nodules [Heart Rate And Rhythm] : heart rate and rhythm were normal [Heart Sounds] : normal S1 and S2 [Murmurs] : no murmurs present [Arterial Pulses Normal] : the arterial pulses were normal [Edema] : no peripheral edema present [Respiration, Rhythm And Depth] : normal respiratory rhythm and effort [Exaggerated Use Of Accessory Muscles For Inspiration] : no accessory muscle use [Auscultation Breath Sounds / Voice Sounds] : lungs were clear to auscultation bilaterally [Chest Palpation] : palpation of the chest revealed no abnormalities [Lungs Percussion] : the lungs were normal to percussion [Bowel Sounds] : normal bowel sounds [Abdomen Soft] : soft [Abdomen Tenderness] : non-tender [Abdomen Mass (___ Cm)] : no abdominal mass palpated [Nail Clubbing] : no clubbing of the fingernails [Cyanosis, Localized] : no localized cyanosis [Petechial Hemorrhages (___cm)] : no petechial hemorrhages [] : no ischemic changes [Deep Tendon Reflexes (DTR)] : deep tendon reflexes were 2+ and symmetric [Sensation] : the sensory exam was normal to light touch and pinprick [No Focal Deficits] : no focal deficits [Oriented To Time, Place, And Person] : oriented to person, place, and time [Impaired Insight] : insight and judgment were intact [Affect] : the affect was normal [FreeTextEntry1] : non icteric

## 2020-11-05 NOTE — DISCUSSION/SUMMARY
[FreeTextEntry1] : COPD-centrilobular emphysema\par  Hx hemoptysis no recurrence\par hx mild MS\par Hx pulmonary nodule\par based on last CT CHEST no indication f/u CT scans\par If CAT scan chest completed 5/16/2017 which demonstrated a stable left upper lobe 5 mm pulmonary nodule dating back to scans from 2012\par rx updated\par on Symbicort with instructions to rinse\par PFT next visit\par Flu Vaccine up to  date 2020

## 2020-11-05 NOTE — HISTORY OF PRESENT ILLNESS
[Stable] : are stable [Difficulty Breathing During Exertion] : stable dyspnea on exertion [Feelings Of Weakness On Exertion] : stable exercise intolerance [Cough] : stable coughing [Wheezing] : denies wheezing [Regional Soft Tissue Swelling Both Lower Extremities] : denies lower extremity edema [Chest Pain Or Discomfort] : denies chest pain [Fever] : denies fever [Current] : current [>= 30 pack years] : >= 30 pack years [TextBox_11] : 1/2 [TextBox_22] : Vaping [Wt Gain ___ Lbs] : no recent weight gain [Wt Loss ___ Lbs] : no recent weight loss [FreeTextEntry1] : CT chest July 2017\par Serial serial comparison demonstrated stability of a 5 mm left lower lobe pulmonary nodule\par Stable MATIAS\par  No significant cough\par Denies hemoptysis \par no fever chills  sweats\par

## 2020-11-05 NOTE — PROCEDURE
[FreeTextEntry1] : \par \par X-ray PA lateral November 5, 2020\par Cardiomegaly\par Calcification aortic knob\par No dominant pulmonary nodules parenchymal infiltrates or pleural effusions\par Lamination right hemidiaphragm\par No gross interval change compared to chest x-ray 46 2020\par Chest x-ray PA lateral August 6, 2020\par Borderline cardiomegaly\par Mild right scoliosis\par Minimal calcification aortic knob\par Kyphosis noted as well as thinning vertebral spine\par No dominant pulmonary nodules parenchymal infiltrates or pleural effusions\par Prior noted blunting of the left costophrenic angle has resolved\par \par Pulmonary  6-minute walk step stress test August 6, 2020\par Room air Baseline O2 saturation 96%\par [place] desaturation to 91% at 6 minutes with an increased heart rate 209\par Resting recovery O2 saturation returned to 95% on room air and minute 8 with heart rate of 80\par Impression normal study\par No evidence of a exercise-induced hypoxemia that would require oxygen therapy\par \par PFT August 6, 2020\par Well-preserved flow rates\par Very minimal obstructive pattern\par Normal lung volumes with a total opacity 99% predicted.\par Mild reduction diffusion 70% of predicted with a minimal loss of functioning alveolocapillary units\par Hemoglobin 12.9\par \par Chest x-ray PA July 1, 2020\par Cardiomegaly\par Mild calcification aortic knob\par Blunting left costophrenic angle\par No evidence of parenchymal infiltrates pleural effusions dominant pulmonary nodules\par Soft tissue bony structures grossly normal\par Compared to chest x-ray of July 29, 2019 only interval change is the very mild blunting of the left costophrenic angle not previously appreciated \par Recommendation repeat chest x-ray 1 month\par \par PFT 12 /18/2019 will preserve normal flow rates\par Lung volumes normal\par Diffusion 75% of predicted.\par Hemoglobin 13.7\par \par Chest x-ray PA lateral July 9, 2019\par Cardiomegaly\par Consultation aortic knob\par Clear lung fields without parenchymal infiltrates pleural effusions dominant pulmonary nodules.\par Kyphosis with thinning vertebral spine\par \par Chest x-ray PA and lateral projection Aug 2018\par Significant cardiomegaly demonstrated\par Very minimal calcification of the aortic knob\par Fields are grossly clear no appreciated pulmonary nodule\par Negative pleural effusion\par Soft tissue bony structures demonstrate a kyphoscoliosis\par \par CT chest 5/16/2017\par Stable left upper lobe 5 mm pulmonary nodule dating back to 2012\par Centrilobular emphysema\par Bilateral scattered areas of linear/subsegmental atelectasis\par \par PCV 23 IM November 5, 2020

## 2020-12-18 ENCOUNTER — RX RENEWAL (OUTPATIENT)
Age: 85
End: 2020-12-18

## 2020-12-18 RX ORDER — BUDESONIDE AND FORMOTEROL FUMARATE DIHYDRATE 160; 4.5 UG/1; UG/1
160-4.5 AEROSOL RESPIRATORY (INHALATION) TWICE DAILY
Qty: 1 | Refills: 3 | Status: ACTIVE | COMMUNITY
Start: 2020-02-25 | End: 1900-01-01

## 2021-05-18 ENCOUNTER — TRANSCRIPTION ENCOUNTER (OUTPATIENT)
Age: 86
End: 2021-05-18

## 2021-06-09 ENCOUNTER — APPOINTMENT (OUTPATIENT)
Dept: PULMONOLOGY | Facility: CLINIC | Age: 86
End: 2021-06-09
Payer: MEDICARE

## 2021-06-09 VITALS
HEART RATE: 78 BPM | TEMPERATURE: 97 F | DIASTOLIC BLOOD PRESSURE: 80 MMHG | SYSTOLIC BLOOD PRESSURE: 145 MMHG | OXYGEN SATURATION: 93 %

## 2021-06-09 DIAGNOSIS — Z71.89 OTHER SPECIFIED COUNSELING: ICD-10-CM

## 2021-06-09 DIAGNOSIS — J43.9 EMPHYSEMA, UNSPECIFIED: ICD-10-CM

## 2021-06-09 PROCEDURE — 99072 ADDL SUPL MATRL&STAF TM PHE: CPT

## 2021-06-09 PROCEDURE — 99214 OFFICE O/P EST MOD 30 MIN: CPT | Mod: 25

## 2021-06-09 PROCEDURE — 71046 X-RAY EXAM CHEST 2 VIEWS: CPT

## 2021-06-09 NOTE — DISCUSSION/SUMMARY
[FreeTextEntry1] : COPD-centrilobular emphysema\par  Hx hemoptysis no recurrence\par hx mild MS\par Hx pulmonary nodule\par based on last CT CHEST no indication f/u CT scans\par If CAT scan chest completed 5/16/2017 which demonstrated a stable left upper lobe 5 mm pulmonary nodule dating back to scans from 2012\par rx updated\par on Symbicort with instructions to rinse\par PFT next visit\par Flu Vaccine up to  date 2020\par Covid vaccination risk-benefit discussed at present patient is resistant to getting the Covid vaccine\par Office follow-up 6 months PFT but will need swab

## 2021-06-09 NOTE — PROCEDURE
[FreeTextEntry1] : Chest x-ray PA lateral June 9, 2021\par Cardiomegaly\par Aortic knob calcification\par S-shaped scoliosis\par No parenchymal infiltrates pleural effusions\par No dominant pulmonary nodules\par Gross interval change compared to chest x-ray of November 5, 2020\par \par X-ray PA lateral November 5, 2020\par Cardiomegaly\par Calcification aortic knob\par No dominant pulmonary nodules parenchymal infiltrates or pleural effusions\par Lamination right hemidiaphragm\par No gross interval change compared to chest x-ray 46 2020\par Chest x-ray PA lateral August 6, 2020\par Borderline cardiomegaly\par Mild right scoliosis\par Minimal calcification aortic knob\par Kyphosis noted as well as thinning vertebral spine\par No dominant pulmonary nodules parenchymal infiltrates or pleural effusions\par Prior noted blunting of the left costophrenic angle has resolved\par \par Pulmonary  6-minute walk step stress test August 6, 2020\par Room air Baseline O2 saturation 96%\par [place] desaturation to 91% at 6 minutes with an increased heart rate 209\par Resting recovery O2 saturation returned to 95% on room air and minute 8 with heart rate of 80\par Impression normal study\par No evidence of a exercise-induced hypoxemia that would require oxygen therapy\par \par PFT August 6, 2020\par Well-preserved flow rates\par Very minimal obstructive pattern\par Normal lung volumes with a total opacity 99% predicted.\par Mild reduction diffusion 70% of predicted with a minimal loss of functioning alveolocapillary units\par Hemoglobin 12.9\par \par Chest x-ray PA July 1, 2020\par Cardiomegaly\par Mild calcification aortic knob\par Blunting left costophrenic angle\par No evidence of parenchymal infiltrates pleural effusions dominant pulmonary nodules\par Soft tissue bony structures grossly normal\par Compared to chest x-ray of July 29, 2019 only interval change is the very mild blunting of the left costophrenic angle not previously appreciated \par Recommendation repeat chest x-ray 1 month\par \par PFT 12 /18/2019 will preserve normal flow rates\par Lung volumes normal\par Diffusion 75% of predicted.\par Hemoglobin 13.7\par \par Chest x-ray PA lateral July 9, 2019\par Cardiomegaly\par Consultation aortic knob\par Clear lung fields without parenchymal infiltrates pleural effusions dominant pulmonary nodules.\par Kyphosis with thinning vertebral spine\par \par Chest x-ray PA and lateral projection Aug 2018\par Significant cardiomegaly demonstrated\par Very minimal calcification of the aortic knob\par Fields are grossly clear no appreciated pulmonary nodule\par Negative pleural effusion\par Soft tissue bony structures demonstrate a kyphoscoliosis\par \par CT chest 5/16/2017\par Stable left upper lobe 5 mm pulmonary nodule dating back to 2012\par Centrilobular emphysema\par Bilateral scattered areas of linear/subsegmental atelectasis\par \par PCV 23 IM November 5, 2020

## 2021-06-09 NOTE — HISTORY OF PRESENT ILLNESS
[Current] : current [>= 30 pack years] : >= 30 pack years [Stable] : are stable [Difficulty Breathing During Exertion] : stable dyspnea on exertion [Feelings Of Weakness On Exertion] : stable exercise intolerance [Cough] : stable coughing [Wheezing] : denies wheezing [Regional Soft Tissue Swelling Both Lower Extremities] : denies lower extremity edema [Chest Pain Or Discomfort] : denies chest pain [Fever] : denies fever [TextBox_11] : 1/2 [TextBox_22] : Vaping [Wt Gain ___ Lbs] : no recent weight gain [Wt Loss ___ Lbs] : no recent weight loss [FreeTextEntry1] : CT chest July 2017\par Serial serial comparison demonstrated stability of a 5 mm left lower lobe pulmonary nodule\par Stable MATIAS\par  No significant cough\par Denies hemoptysis \par no fever chills  sweats\par

## 2021-10-12 ENCOUNTER — NON-APPOINTMENT (OUTPATIENT)
Age: 86
End: 2021-10-12

## 2022-01-01 NOTE — H&P ADULT - PMH
COPD (chronic obstructive pulmonary disease)    COPD (chronic obstructive pulmonary disease)    Diverticulitis    Hyperlipidemia (ICD9 272.4)    Meniscus, Medial, tear  Left  Osteopenia    Smoker    Thyroid Nodule  @ 1974
(1) a few with stimulation
(2) spontaneous and intermittent (24 hrs old)
(0) none

## 2023-02-19 ENCOUNTER — RX RENEWAL (OUTPATIENT)
Age: 88
End: 2023-02-19

## 2023-02-23 ENCOUNTER — RX RENEWAL (OUTPATIENT)
Age: 88
End: 2023-02-23

## 2024-04-29 NOTE — DISCHARGE NOTE ADULT - CARE PROVIDER_API CALL
WFL
Paris Hamm), Internal Medicine  [address] [facility]  [address]  [place] [address]  Phone: [phone]  Fax: [phone]    Austin Awad (), Internal Medicine; Pulmonary Disease  [address] [facility]  [address]  [place] [address]  Phone: [phone]  Fax: [phone]

## 2024-09-03 NOTE — DISCHARGE NOTE ADULT - NSTOBACCONEVERSMOKERY/N_GEN_A
No
Patient seen at bedside time spent evaluating and treating the patient's acute illness as well as time spent reviewing labs, radiology, discussing with patient and/or patient's family, and discussing the case with a multidisciplinary team.

## 2024-10-15 NOTE — ED ADULT NURSE NOTE - PMH
COPD (chronic obstructive pulmonary disease)    COPD (chronic obstructive pulmonary disease)    Diverticulitis    Hyperlipidemia (ICD9 272.4)    Meniscus, Medial, tear  Left  Osteopenia    Smoker    Thyroid Nodule  @ 1974 unstageable